# Patient Record
Sex: MALE | Race: WHITE | Employment: UNEMPLOYED | URBAN - METROPOLITAN AREA
[De-identification: names, ages, dates, MRNs, and addresses within clinical notes are randomized per-mention and may not be internally consistent; named-entity substitution may affect disease eponyms.]

---

## 2021-08-07 ENCOUNTER — APPOINTMENT (OUTPATIENT)
Dept: GENERAL RADIOLOGY | Age: 51
DRG: 720 | End: 2021-08-07
Attending: INTERNAL MEDICINE
Payer: COMMERCIAL

## 2021-08-07 ENCOUNTER — HOSPITAL ENCOUNTER (INPATIENT)
Age: 51
LOS: 6 days | Discharge: HOME | DRG: 720 | End: 2021-08-13
Attending: INTERNAL MEDICINE | Admitting: INTERNAL MEDICINE
Payer: COMMERCIAL

## 2021-08-07 ENCOUNTER — APPOINTMENT (OUTPATIENT)
Dept: NON INVASIVE DIAGNOSTICS | Age: 51
DRG: 720 | End: 2021-08-07
Attending: INTERNAL MEDICINE
Payer: COMMERCIAL

## 2021-08-07 ENCOUNTER — APPOINTMENT (OUTPATIENT)
Dept: CT IMAGING | Age: 51
DRG: 720 | End: 2021-08-07
Attending: INTERNAL MEDICINE
Payer: COMMERCIAL

## 2021-08-07 PROBLEM — N17.9 AKI (ACUTE KIDNEY INJURY) (HCC): Status: ACTIVE | Noted: 2021-08-07

## 2021-08-07 LAB
ALBUMIN SERPL-MCNC: 2.3 G/DL (ref 3.5–5)
ALBUMIN SERPL-MCNC: 2.4 G/DL (ref 3.5–5)
ALBUMIN SERPL-MCNC: 2.7 G/DL (ref 3.5–5)
ALBUMIN/GLOB SERPL: 0.7 {RATIO} (ref 1.1–2.2)
ALBUMIN/GLOB SERPL: 0.8 {RATIO} (ref 1.1–2.2)
ALBUMIN/GLOB SERPL: 0.8 {RATIO} (ref 1.1–2.2)
ALP SERPL-CCNC: 103 U/L (ref 45–117)
ALP SERPL-CCNC: 87 U/L (ref 45–117)
ALP SERPL-CCNC: 91 U/L (ref 45–117)
ALT SERPL-CCNC: 12 U/L (ref 12–78)
ANION GAP SERPL CALC-SCNC: 16 MMOL/L (ref 5–15)
ANION GAP SERPL CALC-SCNC: 7 MMOL/L (ref 5–15)
ANION GAP SERPL CALC-SCNC: 8 MMOL/L (ref 5–15)
APPEARANCE UR: CLEAR
AST SERPL-CCNC: 7 U/L (ref 15–37)
AST SERPL-CCNC: 7 U/L (ref 15–37)
AST SERPL-CCNC: 8 U/L (ref 15–37)
AV R PG: 55.19 MMHG
BACTERIA URNS QL MICRO: NEGATIVE /HPF
BASOPHILS # BLD: 0 K/UL (ref 0–0.1)
BASOPHILS NFR BLD: 0 % (ref 0–1)
BILIRUB SERPL-MCNC: 0.4 MG/DL (ref 0.2–1)
BILIRUB SERPL-MCNC: 0.4 MG/DL (ref 0.2–1)
BILIRUB SERPL-MCNC: 0.5 MG/DL (ref 0.2–1)
BILIRUB UR QL: NEGATIVE
BUN SERPL-MCNC: 117 MG/DL (ref 6–20)
BUN SERPL-MCNC: 127 MG/DL (ref 6–20)
BUN SERPL-MCNC: 94 MG/DL (ref 6–20)
BUN/CREAT SERPL: 11 (ref 12–20)
BUN/CREAT SERPL: 13 (ref 12–20)
BUN/CREAT SERPL: 16 (ref 12–20)
CA-I BLD-SCNC: 1.17 MMOL/L (ref 1.12–1.32)
CALCIUM SERPL-MCNC: 8.2 MG/DL (ref 8.5–10.1)
CALCIUM SERPL-MCNC: 8.3 MG/DL (ref 8.5–10.1)
CALCIUM SERPL-MCNC: 8.8 MG/DL (ref 8.5–10.1)
CHLORIDE SERPL-SCNC: 106 MMOL/L (ref 97–108)
CHLORIDE SERPL-SCNC: 108 MMOL/L (ref 97–108)
CHLORIDE SERPL-SCNC: 109 MMOL/L (ref 97–108)
CO2 SERPL-SCNC: 20 MMOL/L (ref 21–32)
CO2 SERPL-SCNC: 26 MMOL/L (ref 21–32)
CO2 SERPL-SCNC: 28 MMOL/L (ref 21–32)
COLOR UR: ABNORMAL
CREAT SERPL-MCNC: 11.9 MG/DL (ref 0.7–1.3)
CREAT SERPL-MCNC: 5.99 MG/DL (ref 0.7–1.3)
CREAT SERPL-MCNC: 8.87 MG/DL (ref 0.7–1.3)
DIFFERENTIAL METHOD BLD: ABNORMAL
ECHO AO ROOT DIAM: 3.45 CM
ECHO AR MAX VEL PISA: 371.44 CM/S
ECHO AV AREA PEAK VELOCITY: 2.2 CM2
ECHO AV AREA/BSA PEAK VELOCITY: 1.2 CM2/M2
ECHO AV PEAK GRADIENT: 10.94 MMHG
ECHO AV PEAK VELOCITY: 165.41 CM/S
ECHO AV REGURGITANT PHT: 275.24 MS
ECHO EST RA PRESSURE: 10 MMHG
ECHO IVC PROX: 2.19 CM
ECHO LA MAJOR AXIS: 3.21 CM
ECHO LA MINOR AXIS: 1.68 CM
ECHO LV E' LATERAL VELOCITY: 9.08 CM/S
ECHO LV E' SEPTAL VELOCITY: 6.3 CM/S
ECHO LV EDV A2C: 96.01 ML
ECHO LV EDV A4C: 61.83 ML
ECHO LV EDV BP: 77.68 ML (ref 67–155)
ECHO LV EDV INDEX A4C: 32.4 ML/M2
ECHO LV EDV INDEX BP: 40.7 ML/M2
ECHO LV EDV NDEX A2C: 50.3 ML/M2
ECHO LV EJECTION FRACTION A2C: 72 PERCENT
ECHO LV EJECTION FRACTION A4C: 60 PERCENT
ECHO LV EJECTION FRACTION BIPLANE: 66.2 PERCENT (ref 55–100)
ECHO LV ESV A2C: 27.02 ML
ECHO LV ESV A4C: 24.75 ML
ECHO LV ESV BP: 26.23 ML (ref 22–58)
ECHO LV ESV INDEX A2C: 14.1 ML/M2
ECHO LV ESV INDEX A4C: 13 ML/M2
ECHO LV ESV INDEX BP: 13.7 ML/M2
ECHO LV INTERNAL DIMENSION DIASTOLIC: 4.44 CM (ref 4.2–5.9)
ECHO LV INTERNAL DIMENSION SYSTOLIC: 2.98 CM
ECHO LV IVSD: 1.05 CM (ref 0.6–1)
ECHO LV MASS 2D: 163.7 G (ref 88–224)
ECHO LV MASS INDEX 2D: 85.7 G/M2 (ref 49–115)
ECHO LV POSTERIOR WALL DIASTOLIC: 1.08 CM (ref 0.6–1)
ECHO LVOT DIAM: 2.04 CM
ECHO LVOT PEAK GRADIENT: 5.01 MMHG
ECHO LVOT PEAK VELOCITY: 111.92 CM/S
ECHO MV A VELOCITY: 81.06 CM/S
ECHO MV AREA PHT: 6.3 CM2
ECHO MV E DECELERATION TIME (DT): 120.49 MS
ECHO MV E VELOCITY: 49.61 CM/S
ECHO MV E/A RATIO: 0.61
ECHO MV E/E' LATERAL: 5.46
ECHO MV E/E' RATIO (AVERAGED): 6.67
ECHO MV E/E' SEPTAL: 7.87
ECHO MV PRESSURE HALF TIME (PHT): 34.94 MS
ECHO PV PEAK INSTANTANEOUS GRADIENT SYSTOLIC: 2.75 MMHG
ECHO RIGHT VENTRICULAR SYSTOLIC PRESSURE (RVSP): 38.66 MMHG
ECHO RV INTERNAL DIMENSION: 3.32 CM
ECHO RV TAPSE: 1.46 CM (ref 1.5–2)
ECHO TV REGURGITANT MAX VELOCITY: 267.66 CM/S
ECHO TV REGURGITANT PEAK GRADIENT: 28.66 MMHG
EOSINOPHIL # BLD: 0 K/UL (ref 0–0.4)
EOSINOPHIL NFR BLD: 0 % (ref 0–7)
EPITH CASTS URNS QL MICRO: ABNORMAL /LPF
ERYTHROCYTE [DISTWIDTH] IN BLOOD BY AUTOMATED COUNT: 12.4 % (ref 11.5–14.5)
GLOBULIN SER CALC-MCNC: 3.1 G/DL (ref 2–4)
GLOBULIN SER CALC-MCNC: 3.3 G/DL (ref 2–4)
GLOBULIN SER CALC-MCNC: 3.3 G/DL (ref 2–4)
GLUCOSE SERPL-MCNC: 126 MG/DL (ref 65–100)
GLUCOSE SERPL-MCNC: 137 MG/DL (ref 65–100)
GLUCOSE SERPL-MCNC: 219 MG/DL (ref 65–100)
GLUCOSE UR STRIP.AUTO-MCNC: NEGATIVE MG/DL
HCT VFR BLD AUTO: 30.8 % (ref 36.6–50.3)
HGB BLD-MCNC: 10.2 G/DL (ref 12.1–17)
HGB UR QL STRIP: ABNORMAL
IMM GRANULOCYTES # BLD AUTO: 0 K/UL
IMM GRANULOCYTES NFR BLD AUTO: 0 %
INR PPP: 1.2 (ref 0.9–1.1)
KETONES UR QL STRIP.AUTO: NEGATIVE MG/DL
LACTATE SERPL-SCNC: 1.1 MMOL/L (ref 0.4–2)
LEUKOCYTE ESTERASE UR QL STRIP.AUTO: NEGATIVE
LYMPHOCYTES # BLD: 0 K/UL (ref 0.8–3.5)
LYMPHOCYTES NFR BLD: 0 % (ref 12–49)
MAGNESIUM SERPL-MCNC: 1.6 MG/DL (ref 1.6–2.4)
MAGNESIUM SERPL-MCNC: 1.7 MG/DL (ref 1.6–2.4)
MAGNESIUM SERPL-MCNC: 2 MG/DL (ref 1.6–2.4)
MCH RBC QN AUTO: 30.6 PG (ref 26–34)
MCHC RBC AUTO-ENTMCNC: 33.1 G/DL (ref 30–36.5)
MCV RBC AUTO: 92.5 FL (ref 80–99)
MONOCYTES # BLD: 0 K/UL (ref 0–1)
MONOCYTES NFR BLD: 0 % (ref 5–13)
NEUTS SEG # BLD: 0 K/UL (ref 1.8–8)
NEUTS SEG NFR BLD: 0 % (ref 32–75)
NITRITE UR QL STRIP.AUTO: NEGATIVE
NRBC # BLD: 0 K/UL (ref 0–0.01)
NRBC BLD-RTO: 0 PER 100 WBC
PH UR STRIP: 5 [PH] (ref 5–8)
PHOSPHATE SERPL-MCNC: 4.7 MG/DL (ref 2.6–4.7)
PHOSPHATE SERPL-MCNC: 5.5 MG/DL (ref 2.6–4.7)
PHOSPHATE SERPL-MCNC: 7 MG/DL (ref 2.6–4.7)
PLATELET # BLD AUTO: 187 K/UL (ref 150–400)
PMV BLD AUTO: 10.8 FL (ref 8.9–12.9)
POTASSIUM SERPL-SCNC: 3.7 MMOL/L (ref 3.5–5.1)
POTASSIUM SERPL-SCNC: 4.2 MMOL/L (ref 3.5–5.1)
POTASSIUM SERPL-SCNC: 4.2 MMOL/L (ref 3.5–5.1)
PROCALCITONIN SERPL-MCNC: 3.18 NG/ML
PROT SERPL-MCNC: 5.5 G/DL (ref 6.4–8.2)
PROT SERPL-MCNC: 5.6 G/DL (ref 6.4–8.2)
PROT SERPL-MCNC: 6 G/DL (ref 6.4–8.2)
PROT UR STRIP-MCNC: 30 MG/DL
PROTHROMBIN TIME: 12.5 SEC (ref 9–11.1)
RBC # BLD AUTO: 3.33 M/UL (ref 4.1–5.7)
RBC #/AREA URNS HPF: ABNORMAL /HPF (ref 0–5)
RBC MORPH BLD: ABNORMAL
SODIUM SERPL-SCNC: 142 MMOL/L (ref 136–145)
SODIUM SERPL-SCNC: 142 MMOL/L (ref 136–145)
SODIUM SERPL-SCNC: 144 MMOL/L (ref 136–145)
SP GR UR REFRACTOMETRY: 1.01 (ref 1–1.03)
TOTAL CELLS COUNTED SPEC: 0
UR CULT HOLD, URHOLD: NORMAL
URATE SERPL-MCNC: 11.2 MG/DL (ref 3.5–7.2)
URATE SERPL-MCNC: 14.5 MG/DL (ref 3.5–7.2)
URATE SERPL-MCNC: 7.1 MG/DL (ref 3.5–7.2)
UROBILINOGEN UR QL STRIP.AUTO: 0.2 EU/DL (ref 0.2–1)
WBC # BLD AUTO: 0.2 K/UL (ref 4.1–11.1)
WBC MORPH BLD: ABNORMAL
WBC URNS QL MICRO: ABNORMAL /HPF (ref 0–4)

## 2021-08-07 PROCEDURE — 74011250636 HC RX REV CODE- 250/636: Performed by: INTERNAL MEDICINE

## 2021-08-07 PROCEDURE — 74011000258 HC RX REV CODE- 258: Performed by: INTERNAL MEDICINE

## 2021-08-07 PROCEDURE — 36415 COLL VENOUS BLD VENIPUNCTURE: CPT

## 2021-08-07 PROCEDURE — 83605 ASSAY OF LACTIC ACID: CPT

## 2021-08-07 PROCEDURE — 65610000006 HC RM INTENSIVE CARE

## 2021-08-07 PROCEDURE — 85025 COMPLETE CBC W/AUTO DIFF WBC: CPT

## 2021-08-07 PROCEDURE — 74011250637 HC RX REV CODE- 250/637: Performed by: INTERNAL MEDICINE

## 2021-08-07 PROCEDURE — 93306 TTE W/DOPPLER COMPLETE: CPT | Performed by: INTERNAL MEDICINE

## 2021-08-07 PROCEDURE — 77010033678 HC OXYGEN DAILY

## 2021-08-07 PROCEDURE — 82330 ASSAY OF CALCIUM: CPT

## 2021-08-07 PROCEDURE — 84145 PROCALCITONIN (PCT): CPT

## 2021-08-07 PROCEDURE — 74011000250 HC RX REV CODE- 250: Performed by: INTERNAL MEDICINE

## 2021-08-07 PROCEDURE — 84550 ASSAY OF BLOOD/URIC ACID: CPT

## 2021-08-07 PROCEDURE — 83735 ASSAY OF MAGNESIUM: CPT

## 2021-08-07 PROCEDURE — 74176 CT ABD & PELVIS W/O CONTRAST: CPT

## 2021-08-07 PROCEDURE — 71045 X-RAY EXAM CHEST 1 VIEW: CPT

## 2021-08-07 PROCEDURE — 80053 COMPREHEN METABOLIC PANEL: CPT

## 2021-08-07 PROCEDURE — 71250 CT THORAX DX C-: CPT

## 2021-08-07 PROCEDURE — 93306 TTE W/DOPPLER COMPLETE: CPT

## 2021-08-07 PROCEDURE — 74011000250 HC RX REV CODE- 250: Performed by: NURSE PRACTITIONER

## 2021-08-07 PROCEDURE — 84100 ASSAY OF PHOSPHORUS: CPT

## 2021-08-07 PROCEDURE — 87040 BLOOD CULTURE FOR BACTERIA: CPT

## 2021-08-07 PROCEDURE — 81001 URINALYSIS AUTO W/SCOPE: CPT

## 2021-08-07 PROCEDURE — 85610 PROTHROMBIN TIME: CPT

## 2021-08-07 RX ORDER — ACETAMINOPHEN 650 MG/1
650 SUPPOSITORY RECTAL
Status: DISCONTINUED | OUTPATIENT
Start: 2021-08-07 | End: 2021-08-13 | Stop reason: HOSPADM

## 2021-08-07 RX ORDER — NOREPINEPHRINE BITARTRATE/D5W 8 MG/250ML
0-16 PLASTIC BAG, INJECTION (ML) INTRAVENOUS
Status: DISCONTINUED | OUTPATIENT
Start: 2021-08-07 | End: 2021-08-09

## 2021-08-07 RX ORDER — SODIUM BICARBONATE IN D5W 150/1000ML
PLASTIC BAG, INJECTION (ML) INTRAVENOUS CONTINUOUS
Status: DISCONTINUED | OUTPATIENT
Start: 2021-08-07 | End: 2021-08-07

## 2021-08-07 RX ORDER — SODIUM CHLORIDE, SODIUM LACTATE, POTASSIUM CHLORIDE, CALCIUM CHLORIDE 600; 310; 30; 20 MG/100ML; MG/100ML; MG/100ML; MG/100ML
50 INJECTION, SOLUTION INTRAVENOUS CONTINUOUS
Status: DISCONTINUED | OUTPATIENT
Start: 2021-08-07 | End: 2021-08-13 | Stop reason: HOSPADM

## 2021-08-07 RX ORDER — ACETAMINOPHEN 325 MG/1
650 TABLET ORAL
Status: DISCONTINUED | OUTPATIENT
Start: 2021-08-07 | End: 2021-08-13 | Stop reason: HOSPADM

## 2021-08-07 RX ORDER — SODIUM CHLORIDE 0.9 % (FLUSH) 0.9 %
5-40 SYRINGE (ML) INJECTION AS NEEDED
Status: DISCONTINUED | OUTPATIENT
Start: 2021-08-07 | End: 2021-08-13 | Stop reason: HOSPADM

## 2021-08-07 RX ORDER — POLYETHYLENE GLYCOL 3350 17 G/17G
17 POWDER, FOR SOLUTION ORAL DAILY PRN
Status: DISCONTINUED | OUTPATIENT
Start: 2021-08-07 | End: 2021-08-13 | Stop reason: HOSPADM

## 2021-08-07 RX ORDER — VANCOMYCIN/0.9 % SOD CHLORIDE 1.5G/250ML
1500 PLASTIC BAG, INJECTION (ML) INTRAVENOUS ONCE
Status: COMPLETED | OUTPATIENT
Start: 2021-08-07 | End: 2021-08-07

## 2021-08-07 RX ORDER — ONDANSETRON 4 MG/1
4 TABLET, ORALLY DISINTEGRATING ORAL
Status: DISCONTINUED | OUTPATIENT
Start: 2021-08-07 | End: 2021-08-11

## 2021-08-07 RX ORDER — ONDANSETRON 2 MG/ML
4 INJECTION INTRAMUSCULAR; INTRAVENOUS
Status: DISCONTINUED | OUTPATIENT
Start: 2021-08-07 | End: 2021-08-11

## 2021-08-07 RX ORDER — SODIUM CHLORIDE 0.9 % (FLUSH) 0.9 %
5-40 SYRINGE (ML) INJECTION EVERY 8 HOURS
Status: DISCONTINUED | OUTPATIENT
Start: 2021-08-07 | End: 2021-08-13 | Stop reason: HOSPADM

## 2021-08-07 RX ORDER — HYDROMORPHONE HCL/0.9% NACL/PF 0.5 MG/ML
PLASTIC BAG, INJECTION (ML) INTRAVENOUS CONTINUOUS
Status: DISCONTINUED | OUTPATIENT
Start: 2021-08-07 | End: 2021-08-11

## 2021-08-07 RX ADMIN — NOREPINEPHRINE BITARTRATE 11 MCG/MIN: 1 INJECTION, SOLUTION, CONCENTRATE INTRAVENOUS at 15:04

## 2021-08-07 RX ADMIN — SODIUM CHLORIDE, POTASSIUM CHLORIDE, SODIUM LACTATE AND CALCIUM CHLORIDE 100 ML/HR: 600; 310; 30; 20 INJECTION, SOLUTION INTRAVENOUS at 14:08

## 2021-08-07 RX ADMIN — CEFEPIME 2 G: 2 INJECTION, POWDER, FOR SOLUTION INTRAVENOUS at 04:18

## 2021-08-07 RX ADMIN — TBO-FILGRASTIM 480 MCG: 480 INJECTION, SOLUTION SUBCUTANEOUS at 17:11

## 2021-08-07 RX ADMIN — SODIUM CHLORIDE 3 MG: 9 INJECTION, SOLUTION INTRAVENOUS at 05:05

## 2021-08-07 RX ADMIN — Medication: at 21:04

## 2021-08-07 RX ADMIN — NOREPINEPHRINE BITARTRATE 4 MCG/MIN: 1 INJECTION, SOLUTION, CONCENTRATE INTRAVENOUS at 04:33

## 2021-08-07 RX ADMIN — ACETAMINOPHEN 650 MG: 650 SUPPOSITORY RECTAL at 04:46

## 2021-08-07 RX ADMIN — Medication 10 ML: at 14:58

## 2021-08-07 RX ADMIN — Medication 10 ML: at 05:12

## 2021-08-07 RX ADMIN — VANCOMYCIN HYDROCHLORIDE 1500 MG: 10 INJECTION, POWDER, LYOPHILIZED, FOR SOLUTION INTRAVENOUS at 04:56

## 2021-08-07 RX ADMIN — Medication 10 ML: at 21:10

## 2021-08-07 RX ADMIN — Medication: at 04:43

## 2021-08-07 NOTE — CONSULTS
Hematology Oncology Consultation    Reason for consult:     Admitting Diagnosis: TOM (acute kidney injury) ([facility] [address] [N17.9]    HPI:   48 yr old w m , with advanced gastric cancer,treated by Desi Whitman oncologist in [place], transferred from [facility] ED where he presented with 1 week of  Intractable n/v/d and lethargy, brought in by [facility] from home , s/p folfiri on 7/27/21, off pump on 7/29, with notes indicating that he had emesis 13-15 times/day after initiating chemo on 7/27- had prior episodes , per sister, but not as bad . He was sent home from local infusion center with oral antiemetics and instructions to drink plenty of fluids. Per family member, this continued over next several days and he became increasing lethargic, confused and weak, promting a call to 911. In local ED, he was found to be in TOM - Creat of 17, high K of 6.6 and then transferred to [address] [place] [address] for possible CRRT. Seen by nephrology service here overnight, felt to be in severe TOM - from severe dehydration +/- TLS ( high uric acid) , today was given a dose of rasburicase. Also noted to be profoundly neutropenic, with total wbc 0.2 , hb 8 and normal plts. Impression:   *) Gastric Adenocarcinoma- metastatic, HER2 and PD-L1 negative, MMR intact.    --Started on [facility] [place] 2[place] for initially neoadjuvant chemo (could not tolerate docetaxel)- initial intent was curative, but  Disease progression in June 2021, pt delayed initiation of chemotherapy  --c1d1 folfiri - No pegfilgrastim -7/27/21   --Hold further treatment till clinically stable  and per primary oncologist     *)Neutropenic Sepsis  --I see no bd or urine cultures taken on admission  --s/p 1 dose vanco and on q24h Cefepime - for whatever is worth now, given the fevers overnight, will do bd cultures and urine culture , cxr- unrevealing  --Cont Cefepime - follow cultures, consider non iv contrast CT chest/abdo and pelvis when stable also   --Neutropenic precautions, start filgrastim 480 mcg sc daily- ordered( no pegfilgrastim used)   --Watch daily cbc    *)Intractable n/v and d - after folfiri, although similar but milder symptoms started prior to chemotherapy, per sister. ---Likely chemo related, but need to r/o gastric outlet obstruction/ DOMINIC ( abdo soft but tender )   ---No vomiting overnight, but has not been fed, somewhat lethargic    *)Severe TOM  ---Per nephrology, ATN( from profound , prolonged n/v and diarrhea)  +/- TLS ( would be very unlikely in gastric adenoca)  ---creat, K and U/O is getting better, No CRRT required  ---was given 1 dose rasburicase -8/7/21-per nephrology    *)Pain management  --Was  Followed by palliative care medicine there- on oxycodone 5 mg po q4hr and on MS contin 15 mg po bid, which per his sister, he was trying to take. Control pain aggressively    D/w RN, pt and sister        Discussion: Liliana Bowles is a  48y.o. year old seen in consultation at the request of Dr. orozco . Recommendations:     Imaging:    reviewed    Labs:    Recent Results (from the past 24 hour(s))   METABOLIC PANEL, COMPREHENSIVE    Collection Time: 08/07/21  3:31 AM   Result Value Ref Range    Sodium 142 136 - 145 mmol/L    Potassium 4.2 3.5 - 5.1 mmol/L    Chloride 106 97 - 108 mmol/L    CO2 20 (L) 21 - 32 mmol/L    Anion gap 16 (H) 5 - 15 mmol/L    Glucose 126 (H) 65 - 100 mg/dL     (H) 6 - 20 MG/DL    Creatinine 11.90 (H) 0.70 - 1.30 MG/DL    BUN/Creatinine ratio 11 (L) 12 - 20      GFR est AA 6 (L) >60 ml/min/1.73m2    GFR est non-AA 5 (L) >60 ml/min/1.73m2    Calcium 8.8 8.5 - 10.1 MG/DL    Bilirubin, total 0.5 0.2 - 1.0 MG/DL    ALT (SGPT) 12 12 - 78 U/L    AST (SGOT) 7 (L) 15 - 37 U/L    Alk.  phosphatase 103 45 - 117 U/L    Protein, total 6.0 (L) 6.4 - 8.2 g/dL    Albumin 2.7 (L) 3.5 - 5.0 g/dL    Globulin 3.3 2.0 - 4.0 g/dL    A-G Ratio 0.8 (L) 1.1 - 2.2     LACTIC ACID    Collection Time: 08/07/21  3:31 AM   Result Value Ref Range    Lactic acid 1.1 0.4 - 2.0 MMOL/L   MAGNESIUM Collection Time: 08/07/21  3:31 AM   Result Value Ref Range    Magnesium 2.0 1.6 - 2.4 mg/dL   PHOSPHORUS    Collection Time: 08/07/21  3:31 AM   Result Value Ref Range    Phosphorus 7.0 (H) 2.6 - 4.7 MG/DL   URIC ACID    Collection Time: 08/07/21  3:31 AM   Result Value Ref Range    Uric acid 14.5 (H) 3.5 - 7.2 MG/DL   CBC WITH AUTOMATED DIFF    Collection Time: 08/07/21  4:26 AM   Result Value Ref Range    WBC 0.2 (LL) 4.1 - 11.1 K/uL    RBC 3.33 (L) 4.10 - 5.70 M/uL    HGB 10.2 (L) 12.1 - 17.0 g/dL    HCT 30.8 (L) 36.6 - 50.3 %    MCV 92.5 80.0 - 99.0 FL    MCH 30.6 26.0 - 34.0 PG    MCHC 33.1 30.0 - 36.5 g/dL    RDW 12.4 11.5 - 14.5 %    PLATELET 215 163 - 267 K/uL    MPV 10.8 8.9 - 12.9 FL    NRBC 0.0 0  WBC    ABSOLUTE NRBC 0.00 0.00 - 0.01 K/uL    NEUTROPHILS 0 (L) 32 - 75 %    LYMPHOCYTES 0 (L) 12 - 49 %    MONOCYTES 0 (L) 5 - 13 %    EOSINOPHILS 0 0 - 7 %    BASOPHILS 0 0 - 1 %    IMMATURE GRANULOCYTES 0 %    TOTAL CELLS COUNTED 0      ABS. NEUTROPHILS 0.0 (L) 1.8 - 8.0 K/UL    ABS. LYMPHOCYTES 0.0 (L) 0.8 - 3.5 K/UL    ABS. MONOCYTES 0.0 0.0 - 1.0 K/UL    ABS. EOSINOPHILS 0.0 0.0 - 0.4 K/UL    ABS. BASOPHILS 0.0 0.0 - 0.1 K/UL    ABS. IMM.  GRANS. 0.0 K/UL    DF SMEAR SCANNED      RBC COMMENTS NORMOCYTIC, NORMOCHROMIC      WBC COMMENTS        WBC too low for accurate differential. Scan of smear shows   PROTHROMBIN TIME + INR    Collection Time: 08/07/21  4:26 AM   Result Value Ref Range    INR 1.2 (H) 0.9 - 1.1      Prothrombin time 12.5 (H) 9.0 - 11.1 sec   IONIZED CALCIUM    Collection Time: 08/07/21  4:38 AM   Result Value Ref Range    Calcium, ionized 1.17 1.12 - 1.32 mmol/L   PROCALCITONIN    Collection Time: 08/07/21  7:16 AM   Result Value Ref Range    Procalcitonin 3.18 ng/mL   ECHO ADULT COMPLETE    Collection Time: 08/07/21  9:13 AM   Result Value Ref Range    IVSd 1.05 (A) 0.60 - 1.00 cm    LVIDd 4.44 4.20 - 5.90 cm    LVIDs 2.98 cm    LVOT d 2.04 cm    LVPWd 1.08 (A) 0.60 - 1.00 cm BP EF 66.2 55.0 - 100.0 percent    LV Ejection Fraction MOD 2C 72 percent    LV Ejection Fraction MOD 4C 60 percent    LV ED Vol A2C 96.01 mL    LV ED Vol A4C 61.83 mL    LV ED Vol BP 77.68 67.0 - 155.0 mL    LV ES Vol A2C 27.02 mL    LV ES Vol A4C 24.75 mL    LV ES Vol BP 26.23 22.0 - 58.0 mL    LVOT Peak Gradient 5.01 mmHg    LVOT Peak Velocity 111.92 cm/s    RVIDd 3.32 cm    RVSP 38.66 mmHg    Left Atrium Major Axis 3.21 cm    Est. RA Pressure 10.00 mmHg    Aortic Valve Area by Continuity of Peak Velocity 2.20 cm2    AV R PG 55.19 mmHg    Aortic Regurgitant Pressure Half-time 275.24 ms    AR Max Matty 371.44 cm/s    AoV PG 10.94 mmHg    Aortic Valve Systolic Peak Velocity 009.75 cm/s    MV A Matty 81.06 cm/s    Mitral Valve E Wave Deceleration Time 120.49 ms    MV E Matty 49.61 cm/s    E/E' lateral 5.46     E/E' septal 7.88     LV E' Lateral Velocity 9.08 cm/s    LV E' Septal Velocity 6.30 cm/s    Mitral Valve Pressure Half-time 34.94 ms    MVA (PHT) 6.30 cm2    Pulmonic Valve Systolic Peak Instantaneous Gradient 2.75 mmHg    Tapse 1.46 (A) 1.50 - 2.00 cm    Triscuspid Valve Regurgitation Peak Gradient 28.66 mmHg    TR Max Velocity 267.66 cm/s    Ao Root D 3.45 cm    IVC proximal 6.85 cm   METABOLIC PANEL, COMPREHENSIVE    Collection Time: 08/07/21 10:12 AM   Result Value Ref Range    Sodium 142 136 - 145 mmol/L    Potassium 4.2 3.5 - 5.1 mmol/L    Chloride 108 97 - 108 mmol/L    CO2 26 21 - 32 mmol/L    Anion gap 8 5 - 15 mmol/L    Glucose 219 (H) 65 - 100 mg/dL     (H) 6 - 20 MG/DL    Creatinine 8.87 (H) 0.70 - 1.30 MG/DL    BUN/Creatinine ratio 13 12 - 20      GFR est AA 8 (L) >60 ml/min/1.73m2    GFR est non-AA 6 (L) >60 ml/min/1.73m2    Calcium 8.3 (L) 8.5 - 10.1 MG/DL    Bilirubin, total 0.4 0.2 - 1.0 MG/DL    ALT (SGPT) 12 12 - 78 U/L    AST (SGOT) 7 (L) 15 - 37 U/L    Alk.  phosphatase 91 45 - 117 U/L    Protein, total 5.5 (L) 6.4 - 8.2 g/dL    Albumin 2.4 (L) 3.5 - 5.0 g/dL    Globulin 3.1 2.0 - 4.0 g/dL A-G Ratio 0.8 (L) 1.1 - 2.2     MAGNESIUM    Collection Time: 08/07/21 10:12 AM   Result Value Ref Range    Magnesium 1.7 1.6 - 2.4 mg/dL   PHOSPHORUS    Collection Time: 08/07/21 10:12 AM   Result Value Ref Range    Phosphorus 5.5 (H) 2.6 - 4.7 MG/DL   URIC ACID    Collection Time: 08/07/21 10:12 AM   Result Value Ref Range    Uric acid 11.2 (H) 3.5 - 7.2 MG/DL       History:  Past Medical History:   Diagnosis Date    Other ill-defined conditions(799.89) 8-8-13    Hgb 15.1, K 4.1, creat 0.6, EKG: NSR, ? inf  MI    Other ill-defined conditions(799.89)     obesity    Other ill-defined conditions(799.89) 11-22-13    Hgb 14.9, K 3.8, creat 0.8, EKG: NSR      Past Surgical History:   Procedure Laterality Date    HX KNEE ARTHROSCOPY Left at age 21    ACL repair    HX KNEE REPLACEMENT      HX ORTHOPAEDIC Right     hand    HX ORTHOPAEDIC Left 2013    TKR      Prior to Admission medications    Not on File     No Known Allergies   Social History     Tobacco Use    Smoking status: Current Every Day Smoker     Packs/day: 0.50     Years: 20.00     Pack years: 10.00     Types: Cigarettes   Substance Use Topics    Alcohol use: No      No family history on file.      Current Medications:  Current Facility-Administered Medications   Medication Dose Route Frequency    sodium chloride (NS) flush 5-40 mL  5-40 mL IntraVENous Q8H    sodium chloride (NS) flush 5-40 mL  5-40 mL IntraVENous PRN    acetaminophen (TYLENOL) tablet 650 mg  650 mg Oral Q6H PRN    Or    acetaminophen (TYLENOL) suppository 650 mg  650 mg Rectal Q6H PRN    polyethylene glycol (MIRALAX) packet 17 g  17 g Oral DAILY PRN    ondansetron (ZOFRAN ODT) tablet 4 mg  4 mg Oral Q8H PRN    Or    ondansetron (ZOFRAN) injection 4 mg  4 mg IntraVENous Q6H PRN    NOREPINephrine (LEVOPHED) 8 mg in 5% dextrose 250mL (32 mcg/mL) infusion  0-16 mcg/min IntraVENous TITRATE    vasopressin (VASOSTRICT) 20 Units in 0.9% sodium chloride 100 mL infusion  0.03 Units/min IntraVENous CONTINUOUS    cefepime (MAXIPIME) 2 g in 0.9% sodium chloride (MBP/ADV) 100 mL MBP  2 g IntraVENous Q24H    Vancomycin - Pharmacy to Dose   Other Rx Dosing/Monitoring    lactated Ringers infusion  100 mL/hr IntraVENous CONTINUOUS    [START ON 8/8/2021] Vancomycin random level - due 8/8 with am labs. Thanks!    Other ONCE         Review of Systems:  12 point ROS attempted, pt too lethargic- history from issiter and chart, RN    Physical Exam:  MS-Lethargic, follows commands  General -chronically ill appearing   Neck-Supple, No JVD  CVS-S1,S2 tachy+  Chest wall- Port site -looks fine,   RS-CTA b/l- dec at bases  Abdomen- Soft, tenderness in epigastrium   Extre- No c/c/-1+ edema   Neuro- No FND but gen weakness+

## 2021-08-07 NOTE — CONSULTS
NEPHROLOGY CONSULT NOTE     Patient: Raymon Lester MRN: [id number]  PCP: None   :     1970  Age:   48 y.o. Sex:  male      Referring physician: Mary Carmen French MD  Reason for consultation: 48 y.o. male with TOM (acute kidney injury) ([facility] [H60.6] complicated by TOM   Admission Date: 2021  2:55 AM  LOS: 0 days      ASSESSMENT and PLAN :   TOM:  - suspect 2/2 ATN from profound volume depletion vs TLS  - CT scan from OSH neg for obstruction  - will start CRRT  - pt consented  - Saira Reece aware  - f/u uric acid, phos, K     Hyperk:  - CRRT as above    ? TLS:  - repeat labs pending  - CRRT as above  - oncology consulted    Hypotension:  - on levophed  - keep MAP > 65    CHF:  - EF 25%   - ECHO pending    Gastric cancer    N/V/D     Active Problems / Assessment AAActive  : Active Problems:    TOM (acute kidney injury) ([facility] (2021)         Subjective:   HPI: Raymon Lester is a 48 y.o.  male who has been admitted to the hospital for renal failure and hyperkalemia. He has a hx of gastric ca on chemo, CHF, HLD, GERD, HTN. Presented to [address] [place] [address],[address] center for n/v/d x 2 weeks. Found to have severe renal failure, Cr 19, k 6.6, uric acid 19.5. He had 5 L of fluid and was started on levophed. Remains oliguric. Repeat labs show K 5.2, Cr 19. L IJ dialysis line placed, transferred to [facility] ICU for further care. Pt lethargic, poor historian. Labs here pending. Pt has consented for RRT, no other family around. Past Medical Hx:   Past Medical History:   Diagnosis Date    Other ill-defined conditions(799.89) 8-    Hgb 15.1, K 4.1, creat 0.6, EKG: NSR, ?  inf  MI    Other ill-defined conditions(799.89)     obesity    Other ill-defined conditions(799.89) 13    Hgb 14.9, K 3.8, creat 0.8, EKG: NSR        Past Surgical Hx:     Past Surgical History:   Procedure Laterality Date    HX KNEE ARTHROSCOPY Left at age 21    ACL repair    HX KNEE REPLACEMENT      HX ORTHOPAEDIC Right     hand    HX ORTHOPAEDIC Left 2013    TKR       Medications:  Prior to Admission medications    Not on File       No Known Allergies    Social Hx:  reports that he has been smoking cigarettes. He has a 10.00 pack-year smoking history. He does not have any smokeless tobacco history on file. He reports that he does not drink alcohol and does not use drugs. No family history on file. Review of Systems:  A twelve point review of system was performed today. Pertinent positives and negatives are mentioned in the HPI. The reminder of the ROS is negative and noncontributory. Objective:    Vitals:    Vitals:    08/07/21 0258   BP: 115/85   Pulse: (!) 137   Resp: 19   Temp: (!) 102.2 °F (39 °C)   SpO2: 99%   Weight: 78 kg (171 lb 15.3 oz)   Height: 5' 8\" (1.727 m)     I&O's:  No intake/output data recorded. Visit Vitals  /85 (BP 1 Location: Right upper arm, BP Patient Position: Lying)   Pulse (!) 137   Temp (!) 102.2 °F (39 °C)   Resp 19   Ht 5' 8\" (1.727 m)   Wt 78 kg (171 lb 15.3 oz)   SpO2 99%   BMI 26.15 kg/m²       Physical Exam:  General:lethargic  HEENT: Eyes are PERRL. EOMI  Neck: Aure Oleg in place  Lungs : Clears to auscultation Bilaterally, Normal respiratory effort  CVS: RRR, S1 S2 normal, No rub, 3+ LE edema  Abdomen: Soft, Non tender, No hepatosplenomegaly, bowel sounds present  Extremities: No cyanosis, No clubbing  Skin: No rash or lesions.   Lymph nodes: No palpable nodes  MS: No joint swelling, erythema, warmth  Neurologic: lethargic, nonfocal otherwise  : dawson in place    Laboratory Results:    Lab Results   Component Value Date    BUN 9 08/18/2013     11/22/2013    K 3.9 11/22/2013     11/22/2013    CO2 26 11/22/2013       Lab Results   Component Value Date    BUN 9 08/18/2013    BUN 6 (L) 08/16/2013    BUN 6 (L) 08/15/2013    BUN 11 08/14/2013    BUN 13 08/08/2013    K 3.9 11/22/2013    K 3.4 (L) 08/18/2013    K 3.7 08/16/2013    K 3.9 08/15/2013    K 3.9 08/14/2013       Lab Results Component Value Date    WBC 11.1 (H) 08/18/2013    RBC 3.90 (L) 08/18/2013    HGB 14.9 11/22/2013    HCT 35.3 (L) 08/18/2013    MCV 90.5 08/18/2013    MCH 30.0 08/18/2013    RDW 13.2 08/18/2013     (H) 08/18/2013       No results found for: PTH, PHOS    Urine dipstick:   No results found for: COLOR, APPRN, SPGRU, REFSG, LISSA, PROTU, GLUCU, KETU, BILU, UROU, SHABANA, LEUKU, GLUKE, EPSU, BACTU, WBCU, RBCU, CASTS, UCRY    I have reviewed the following: All pertinent labs, microbiology data, radiology imaging for my assessment     ECG- Rev: Yes / No  Xray/CT/US/MRI REV: Yes/ No        Thank you for allowing us to participate in the care of this patient. We will follow patient. Please dont hesitate to call with any questions    Jaylyn Ramirez MD  8/7/2021    [address] [place] [address] [address]ph[address]  Phone - [phone]   Fax - [phone]  [email or web address]

## 2021-08-07 NOTE — PROGRESS NOTES
0730: Bedside and Verbal shift change report given to [address] [place] [address] (oncoming nurse) by Janes Hector RN (offgoing nurse). Report included the following information SBAR, Kardex, Intake/Output, MAR, Recent Results, Cardiac Rhythm ST and Alarm Parameters . 1120: Attempted to call Oncology consult to [facility]. Primary office was closed, prompts followed and I left a message to receive a callback in order to place the consult. 1130: [facility] returned my call. Consult placed. Dr. Leah Hung will see the patient.

## 2021-08-07 NOTE — PROGRESS NOTES
Day #1 of cefepime - consult to dose with CRRT   Indication:  sepsis  Current regimen:  2gm q8h  Abx regimen: vancomcyin    No results for input(s): WBC, CREA, BUN in the last 72 hours. CrCl result from 21 = 21ml/min  Est CrCl: 21 ml/min  Temp (24hrs), Av.2 °F (39 °C), Min:102.2 °F (39 °C), Max:102.2 °F (39 °C)    Cultures:    Order for crrt not found. Nephrology consult placed. Plan: Change to 2 gram q24 at this time.    Dose can be changed to q12h when CRRT begins

## 2021-08-07 NOTE — PROGRESS NOTES
Patient is non oliguric and excellent UOP. Scr and UA is low. Change IVF to [email or web address] ml/hr.

## 2021-08-07 NOTE — PROGRESS NOTES
1545: Patient transported to [address] with monitor x2 RNs.     3803: Returned to room. Uneventful. 1930: Bedside and Verbal shift change report given to SUSHMA Hadley RN (oncoming nurse) by [employer]. Kilo Krishnan RN (offgoing nurse). Report included the following information SBAR, Kardex, ED Summary, Intake/Output, MAR, Recent Results, Med Rec Status, Cardiac Rhythm NSR and Alarm Parameters .

## 2021-08-07 NOTE — PROGRESS NOTES
Repeat labs show improving Cr, K and acidosis  UA 14.5  UOP improving  Hold CRRT for now  Cont bicarb drip  Will give rasburicase x 1 now  Follow serial labs

## 2021-08-07 NOTE — PROGRESS NOTES
80: TRANSFER - IN REPORT:    Verbal report received from [facility], RN (name) on Dion Jhaveri  being received from [address] [place] [address] (unit) for urgent transfer      Report consisted of patients Situation, Background, Assessment and   Recommendations(SBAR). Information from the following report(s) SBAR, Kardex, ED Summary, Intake/Output, MAR, Recent Results, Cardiac Rhythm Sinus tach and Alarm Parameters  was reviewed with the receiving nurse. Opportunity for questions and clarification was provided. Assessment completed upon patients arrival to unit and care assumed. Primary Nurse Faustina Arechiga RN and Aleida Aponte RN performed a dual skin assessment on this patient No impairment noted. Scar left knee, many scabs on all extremities and many tattoos. Small red blacheable area on heels. Jareth score is 14. 0430: Dr. Landen Frank to bedside to examine pt and discuss plan of care. 0496: Per Dr. Landen Frank, hold off on CRRT for now. Will reassess in AM.     0551: Critical lab result for WBC of 0.2. Syeda Phelan NP notified. No new orders received. 0730: Bedside shift change report given to Parisa Carrillo RN (oncoming nurse) by Marilu Dubose RN and Aleida Aponte RN (offgoing nurse). Report included the following information SBAR, Kardex, Intake/Output, MAR, Recent Results, Cardiac Rhythm Sinus tach and Alarm Parameters .

## 2021-08-08 LAB
ALBUMIN SERPL-MCNC: 2.1 G/DL (ref 3.5–5)
ALBUMIN SERPL-MCNC: 2.3 G/DL (ref 3.5–5)
ALBUMIN SERPL-MCNC: 2.4 G/DL (ref 3.5–5)
ALBUMIN/GLOB SERPL: 0.6 {RATIO} (ref 1.1–2.2)
ALBUMIN/GLOB SERPL: 0.7 {RATIO} (ref 1.1–2.2)
ALBUMIN/GLOB SERPL: 0.7 {RATIO} (ref 1.1–2.2)
ALP SERPL-CCNC: 80 U/L (ref 45–117)
ALP SERPL-CCNC: 84 U/L (ref 45–117)
ALP SERPL-CCNC: 88 U/L (ref 45–117)
ALT SERPL-CCNC: 14 U/L (ref 12–78)
ALT SERPL-CCNC: 15 U/L (ref 12–78)
ALT SERPL-CCNC: 16 U/L (ref 12–78)
ANION GAP SERPL CALC-SCNC: 6 MMOL/L (ref 5–15)
ANION GAP SERPL CALC-SCNC: 8 MMOL/L (ref 5–15)
ANION GAP SERPL CALC-SCNC: 8 MMOL/L (ref 5–15)
AST SERPL-CCNC: 10 U/L (ref 15–37)
AST SERPL-CCNC: 8 U/L (ref 15–37)
AST SERPL-CCNC: 9 U/L (ref 15–37)
BACTERIA SPEC CULT: NORMAL
BACTERIA SPEC CULT: NORMAL
BASOPHILS # BLD: 0 K/UL (ref 0–0.1)
BASOPHILS NFR BLD: 0 % (ref 0–1)
BILIRUB SERPL-MCNC: 0.4 MG/DL (ref 0.2–1)
BUN SERPL-MCNC: 61 MG/DL (ref 6–20)
BUN SERPL-MCNC: 72 MG/DL (ref 6–20)
BUN SERPL-MCNC: 84 MG/DL (ref 6–20)
BUN/CREAT SERPL: 18 (ref 12–20)
BUN/CREAT SERPL: 20 (ref 12–20)
BUN/CREAT SERPL: 24 (ref 12–20)
CALCIUM SERPL-MCNC: 8.1 MG/DL (ref 8.5–10.1)
CALCIUM SERPL-MCNC: 8.6 MG/DL (ref 8.5–10.1)
CALCIUM SERPL-MCNC: 8.7 MG/DL (ref 8.5–10.1)
CHLORIDE SERPL-SCNC: 108 MMOL/L (ref 97–108)
CHLORIDE SERPL-SCNC: 108 MMOL/L (ref 97–108)
CHLORIDE SERPL-SCNC: 109 MMOL/L (ref 97–108)
CO2 SERPL-SCNC: 28 MMOL/L (ref 21–32)
CO2 SERPL-SCNC: 29 MMOL/L (ref 21–32)
CO2 SERPL-SCNC: 29 MMOL/L (ref 21–32)
CREAT SERPL-MCNC: 2.53 MG/DL (ref 0.7–1.3)
CREAT SERPL-MCNC: 3.67 MG/DL (ref 0.7–1.3)
CREAT SERPL-MCNC: 4.66 MG/DL (ref 0.7–1.3)
DIFFERENTIAL METHOD BLD: ABNORMAL
EOSINOPHIL # BLD: 0 K/UL (ref 0–0.4)
EOSINOPHIL NFR BLD: 7 % (ref 0–7)
ERYTHROCYTE [DISTWIDTH] IN BLOOD BY AUTOMATED COUNT: 12.1 % (ref 11.5–14.5)
GLOBULIN SER CALC-MCNC: 3.3 G/DL (ref 2–4)
GLOBULIN SER CALC-MCNC: 3.4 G/DL (ref 2–4)
GLOBULIN SER CALC-MCNC: 3.5 G/DL (ref 2–4)
GLUCOSE SERPL-MCNC: 104 MG/DL (ref 65–100)
GLUCOSE SERPL-MCNC: 108 MG/DL (ref 65–100)
GLUCOSE SERPL-MCNC: 120 MG/DL (ref 65–100)
HCT VFR BLD AUTO: 29.6 % (ref 36.6–50.3)
HGB BLD-MCNC: 9.7 G/DL (ref 12.1–17)
IMM GRANULOCYTES # BLD AUTO: 0 K/UL
IMM GRANULOCYTES NFR BLD AUTO: 0 %
INR PPP: 1.5 (ref 0.9–1.1)
LACTATE SERPL-SCNC: 1.9 MMOL/L (ref 0.4–2)
LYMPHOCYTES # BLD: 0.4 K/UL (ref 0.8–3.5)
LYMPHOCYTES NFR BLD: 60 % (ref 12–49)
MAGNESIUM SERPL-MCNC: 1.5 MG/DL (ref 1.6–2.4)
MAGNESIUM SERPL-MCNC: 1.6 MG/DL (ref 1.6–2.4)
MAGNESIUM SERPL-MCNC: 1.6 MG/DL (ref 1.6–2.4)
MCH RBC QN AUTO: 30.4 PG (ref 26–34)
MCHC RBC AUTO-ENTMCNC: 32.8 G/DL (ref 30–36.5)
MCV RBC AUTO: 92.8 FL (ref 80–99)
MONOCYTES # BLD: 0.2 K/UL (ref 0–1)
MONOCYTES NFR BLD: 22 % (ref 5–13)
NEUTS SEG # BLD: 0.1 K/UL (ref 1.8–8)
NEUTS SEG NFR BLD: 11 % (ref 32–75)
NRBC # BLD: 0 K/UL (ref 0–0.01)
NRBC BLD-RTO: 0 PER 100 WBC
PHOSPHATE SERPL-MCNC: 3.2 MG/DL (ref 2.6–4.7)
PHOSPHATE SERPL-MCNC: 3.9 MG/DL (ref 2.6–4.7)
PHOSPHATE SERPL-MCNC: 4.2 MG/DL (ref 2.6–4.7)
PLATELET # BLD AUTO: 164 K/UL (ref 150–400)
PMV BLD AUTO: 10.3 FL (ref 8.9–12.9)
POTASSIUM SERPL-SCNC: 3.4 MMOL/L (ref 3.5–5.1)
POTASSIUM SERPL-SCNC: 3.8 MMOL/L (ref 3.5–5.1)
POTASSIUM SERPL-SCNC: 3.9 MMOL/L (ref 3.5–5.1)
PROT SERPL-MCNC: 5.4 G/DL (ref 6.4–8.2)
PROT SERPL-MCNC: 5.8 G/DL (ref 6.4–8.2)
PROT SERPL-MCNC: 5.8 G/DL (ref 6.4–8.2)
PROTHROMBIN TIME: 15.4 SEC (ref 9–11.1)
RBC # BLD AUTO: 3.19 M/UL (ref 4.1–5.7)
RBC MORPH BLD: ABNORMAL
SERVICE CMNT-IMP: NORMAL
SODIUM SERPL-SCNC: 144 MMOL/L (ref 136–145)
SODIUM SERPL-SCNC: 144 MMOL/L (ref 136–145)
SODIUM SERPL-SCNC: 145 MMOL/L (ref 136–145)
URATE SERPL-MCNC: 3.4 MG/DL (ref 3.5–7.2)
URATE SERPL-MCNC: 4.4 MG/DL (ref 3.5–7.2)
URATE SERPL-MCNC: 5.4 MG/DL (ref 3.5–7.2)
VANCOMYCIN SERPL-MCNC: 13.3 UG/ML
WBC # BLD AUTO: 0.7 K/UL (ref 4.1–11.1)
WBC MORPH BLD: ABNORMAL

## 2021-08-08 PROCEDURE — 74011250636 HC RX REV CODE- 250/636: Performed by: INTERNAL MEDICINE

## 2021-08-08 PROCEDURE — 85025 COMPLETE CBC W/AUTO DIFF WBC: CPT

## 2021-08-08 PROCEDURE — 84100 ASSAY OF PHOSPHORUS: CPT

## 2021-08-08 PROCEDURE — 36415 COLL VENOUS BLD VENIPUNCTURE: CPT

## 2021-08-08 PROCEDURE — 74011000250 HC RX REV CODE- 250: Performed by: HEALTH CARE PROVIDER

## 2021-08-08 PROCEDURE — 80053 COMPREHEN METABOLIC PANEL: CPT

## 2021-08-08 PROCEDURE — 80202 ASSAY OF VANCOMYCIN: CPT

## 2021-08-08 PROCEDURE — 85610 PROTHROMBIN TIME: CPT

## 2021-08-08 PROCEDURE — 83735 ASSAY OF MAGNESIUM: CPT

## 2021-08-08 PROCEDURE — 83605 ASSAY OF LACTIC ACID: CPT

## 2021-08-08 PROCEDURE — 74011000258 HC RX REV CODE- 258: Performed by: INTERNAL MEDICINE

## 2021-08-08 PROCEDURE — 74011250636 HC RX REV CODE- 250/636: Performed by: HEALTH CARE PROVIDER

## 2021-08-08 PROCEDURE — 84550 ASSAY OF BLOOD/URIC ACID: CPT

## 2021-08-08 PROCEDURE — 65610000006 HC RM INTENSIVE CARE

## 2021-08-08 RX ORDER — HEPARIN SODIUM 5000 [USP'U]/ML
5000 INJECTION, SOLUTION INTRAVENOUS; SUBCUTANEOUS EVERY 8 HOURS
Status: DISCONTINUED | OUTPATIENT
Start: 2021-08-08 | End: 2021-08-13 | Stop reason: HOSPADM

## 2021-08-08 RX ORDER — VANCOMYCIN HYDROCHLORIDE
1250 ONCE
Status: COMPLETED | OUTPATIENT
Start: 2021-08-08 | End: 2021-08-08

## 2021-08-08 RX ADMIN — SODIUM CHLORIDE, POTASSIUM CHLORIDE, SODIUM LACTATE AND CALCIUM CHLORIDE 100 ML/HR: 600; 310; 30; 20 INJECTION, SOLUTION INTRAVENOUS at 01:30

## 2021-08-08 RX ADMIN — VANCOMYCIN HYDROCHLORIDE 1250 MG: 10 INJECTION, POWDER, LYOPHILIZED, FOR SOLUTION INTRAVENOUS at 09:04

## 2021-08-08 RX ADMIN — TBO-FILGRASTIM 480 MCG: 480 INJECTION, SOLUTION SUBCUTANEOUS at 09:04

## 2021-08-08 RX ADMIN — FAMOTIDINE 20 MG: 10 INJECTION, SOLUTION INTRAVENOUS at 09:10

## 2021-08-08 RX ADMIN — Medication 10 ML: at 13:18

## 2021-08-08 RX ADMIN — Medication 10 ML: at 06:22

## 2021-08-08 RX ADMIN — HEPARIN SODIUM 5000 UNITS: 5000 INJECTION INTRAVENOUS; SUBCUTANEOUS at 09:11

## 2021-08-08 RX ADMIN — HEPARIN SODIUM 5000 UNITS: 5000 INJECTION INTRAVENOUS; SUBCUTANEOUS at 17:50

## 2021-08-08 RX ADMIN — SODIUM CHLORIDE, POTASSIUM CHLORIDE, SODIUM LACTATE AND CALCIUM CHLORIDE 100 ML/HR: 600; 310; 30; 20 INJECTION, SOLUTION INTRAVENOUS at 11:52

## 2021-08-08 RX ADMIN — CEFEPIME 2 G: 2 INJECTION, POWDER, FOR SOLUTION INTRAVENOUS at 04:39

## 2021-08-08 RX ADMIN — Medication 10 ML: at 22:00

## 2021-08-08 NOTE — PROGRESS NOTES
Pharmacist Note - Vancomycin Dosing  Therapy day #2  Indication: Possible sepsis of unclear etiology.  - Neutropenic, undergoing chemotherapy for gastric adenocarcinoma (diagnosed Jan '21)  Current regimen: 1.5 gm IV x 1 dose (given 8/7 @ 0456    Recent Labs     08/08/21  0449 08/07/21  2330 08/07/21  1722 08/07/21  1012 08/07/21  0426   WBC 0.7*  --   --   --  0.2*   CREA 3.67* 4.66* 5.99*   < >  --    BUN 72* 84* 94*   < >  --     < > = values in this interval not displayed. A random vancomycin level of 13.3 mcg/mL was obtained ~24 hrs post-dose. Goal target range Trough 10-15 mcg/mL      Plan: The patient's renal function is steadily improving, but Scr is still 3.67. Will continue to dose based on levels for now - a one-time dose of 1250 mg will be ordered and another 24-hr level drawn tomorrow morning. Pharmacy will continue to monitor this patient daily for changes in clinical status and renal function.

## 2021-08-08 NOTE — PROGRESS NOTES
Problem: Breathing Pattern - Ineffective  Goal: *Absence of hypoxia  Outcome: Progressing Towards Goal  Goal: *Use of effective breathing techniques  Outcome: Progressing Towards Goal     Problem: Pressure Injury - Risk of  Goal: *Prevention of pressure injury  Description: Document Jareth Scale and appropriate interventions in the flowsheet. Outcome: Progressing Towards Goal  Note: Pressure Injury Interventions:  Sensory Interventions: Assess changes in LOC, Assess need for specialty bed, Avoid rigorous massage over bony prominences, Check visual cues for pain, Float heels, Keep linens dry and wrinkle-free, Minimize linen layers, Monitor skin under medical devices, Pressure redistribution bed/mattress (bed type), Turn and reposition approx. every two hours (pillows and wedges if needed)    Moisture Interventions: Absorbent underpads, Apply protective barrier, creams and emollients, Assess need for specialty bed, Check for incontinence Q2 hours and as needed, Internal/External urinary devices, Minimize layers    Activity Interventions: Assess need for specialty bed, Pressure redistribution bed/mattress(bed type)    Mobility Interventions: Assess need for specialty bed, Float heels, HOB 30 degrees or less, Pressure redistribution bed/mattress (bed type), Turn and reposition approx. every two hours(pillow and wedges)    Nutrition Interventions: Discuss nutritional consult with provider    Friction and Shear Interventions: HOB 30 degrees or less, Minimize layers, Transferring/repositioning devices                Problem: Falls - Risk of  Goal: *Absence of Falls  Description: Document Saad Fall Risk and appropriate interventions in the flowsheet.   Outcome: Progressing Towards Goal  Note: Fall Risk Interventions:  Mobility Interventions: Communicate number of staff needed for ambulation/transfer    Mentation Interventions: Adequate sleep, hydration, pain control, Bed/chair exit alarm, Door open when patient unattended, Evaluate medications/consider consulting pharmacy, More frequent rounding, Reorient patient, Room close to nurse's station, Update white board    Medication Interventions: Evaluate medications/consider consulting pharmacy    Elimination Interventions: Bed/chair exit alarm, Call light in reach, Patient to call for help with toileting needs, Toileting schedule/hourly rounds    History of Falls Interventions: Bed/chair exit alarm, Door open when patient unattended, Evaluate medications/consider consulting pharmacy, Room close to nurse's station

## 2021-08-08 NOTE — PROGRESS NOTES
[facility]    ICU TEAM Progress Note    Name: Bethene Lesch   : 1970   MRN: [id number]   Date: 2021           ICU Assessment     1. Dehydration  2. TOM  3. Gastric cancer    Imagin2021   None today         ICU Comprehensive Plan of Care:     1. Neurological System  No issues  Spontaneous Awakening Trial: N/A  Sedation: N/A  Analgesia: Dilaudid    2. Cardiovascular System  Wean levophed  SBP Goal of: > 90 mmHg  MAP Goal of: > 65 mmHg  Norepinephrine - For above SBP/MAP goals  Transfusion Trigger (Hgb): <7 g/dL  Keep K > 4; Mg > 2     3. Respiratory System  Incentive spirometry  Spontaneous Breathing Trial: N/A  Pulmonary toilet: Incentive Spirometry   SpO2 Goal: > 92%   DVT Prophylaxis: Heparin     4. Renal/GI/Endocrine System  IVFs: [email or web address]  Ulcer Prophylaxis: Pepcid (famotidine)   Bowel Regimen: MiraLax  Feeding:  Yes soft  Blood Sugar Goal 120-180 - Glycemic Control: Insulin    5. Infectious Disease  neutrapenia  Indwelling Catheter:  Tubes: None  Lines: Central Line  Drains: Ross Catheter  D - De-escalation of Antibiotics:   Cefepime  Vancomycin  COVID Treatment:  n/a     6. PT/OT:      7. Goals of Care Discussion with family Yes     8. Plan of Care/Code Status: Full Code    9. Discussed Care Plan with Bedside RN    10.  Documentation of Current Medications    Subjective:   Progress Note: 2021      Reason for ICU Admission: TOM secondary to dehydration     HPI: refer to HPI    Overnight Events:   2021  No acute overnight events    POD:  * No surgery found *    S/P:       Active Problem List:     Problem List  Date Reviewed: 2013        Codes Class    [facility] ICD-10-CM: N17.9  ICD-9-CM: 584.9         Tobacco abuse ICD-10-CM: Z72.0  ICD-9-CM: 305.1     Overview Signed 2013  6:52 PM by Masood Melgoza MD     Cessation encouraged 2050             Primary localized osteoarthrosis, lower leg ICD-10-CM: M17.10  ICD-9-CM: 715.16 Past Medical History:      has a past medical history of Other ill-defined conditions(799.89) (13), Other ill-defined conditions(799.89), and Other ill-defined conditions(799.89) (13). He also has no past medical history of Difficult intubation, Malignant hyperthermia due to anesthesia, Nausea & vomiting, Pseudocholinesterase deficiency, or Unspecified adverse effect of anesthesia. Past Surgical History:      has a past surgical history that includes hx knee replacement; hx knee arthroscopy (Left, at age 21); hx orthopaedic (Right); and hx orthopaedic (Left, ). Home Medications:     Prior to Admission medications    Not on File       Allergies/Social/Family History:     No Known Allergies   Social History     Tobacco Use    Smoking status: Current Every Day Smoker     Packs/day: 0.50     Years: 20.00     Pack years: 10.00     Types: Cigarettes   Substance Use Topics    Alcohol use: No      No family history on file. Review of Systems:     A comprehensive review of systems was negative. Objective:   Vital Signs:  Visit Vitals  /76   Pulse (!) 107   Temp 100.2 °F (37.9 °C)   Resp 15   Ht 5' 8\" (1.727 m)   Wt 82.3 kg (181 lb 7 oz)   SpO2 97%   BMI 27.59 kg/m²    O2 Flow Rate (L/min): 2 l/min O2 Device: None (Room air) Temp (24hrs), Av.4 °F (38 °C), Min:99.7 °F (37.6 °C), Max:101.1 °F (38.4 °C)           Intake/Output:     Intake/Output Summary (Last 24 hours) at 2021 0859  Last data filed at 2021 0800  Gross per 24 hour   Intake 2880.94 ml   Output 3795 ml   Net -914.06 ml       Physical Exam:    Visit Vitals  /76   Pulse (!) 102   Temp 100.2 °F (37.9 °C)   Resp 14   Ht 5' 8\" (1.727 m)   Wt 82.3 kg (181 lb 7 oz)   SpO2 98%   BMI 27.59 kg/m²     General appearance: alert, cooperative, no distress, appears stated age  Lungs: clear to auscultation bilaterally  Heart: tachy, no m/r/g  Abdomen: soft, non-tender.  Bowel sounds normal. No masses,  no organomegaly  Neurologic: Grossly normal       LABS AND  DATA: Personally reviewed  Recent Labs     08/08/21 0449 08/07/21  0426   WBC 0.7* 0.2*   HGB 9.7* 10.2*   HCT 29.6* 30.8*    187     Recent Labs     08/08/21 0449 08/07/21  2330    144   K 3.8 3.9    108   CO2 29 28   BUN 72* 84*   CREA 3.67* 4.66*   * 120*   CA 8.7 8.6   MG 1.6 1.6   PHOS 3.9 4.2     Recent Labs     08/08/21 0449 08/07/21  2330   AP 84 88   TP 5.8* 5.8*   ALB 2.3* 2.4*   GLOB 3.5 3.4     Recent Labs     08/08/21 0449 08/07/21  0426   INR 1.5* 1.2*   PTP 15.4* 12.5*      No results for input(s): PHI, PCO2I, PO2I, FIO2I in the last 72 hours. No results for input(s): CPK, CKMB, TROIQ, BNPP in the last 72 hours. Hemodynamics:   PAP:   CO:     Wedge:   CI:     CVP:    SVR:       PVR:       Ventilator Settings:  Mode Rate Tidal Volume Pressure FiO2 PEEP                    Peak airway pressure:      Minute ventilation:          MEDS: Reviewed    Chest X-Ray:  CXR Results  (Last 48 hours)               08/07/21 0410  XR CHEST PORT Final result    Impression:  1. Low lung volumes without definite acute process. 2. A right-sided port and left IJ approach catheter. Narrative:  INDICATION: . Lines/port   Additional history:   COMPARISON: None. LIMITATIONS: Portable technique. [place] Neighbours FINDINGS: Single frontal view of the chest.    .   Lines/tubes/surgical: A right-sided port projects to terminate in the mid SVC. A   left IJ approach catheter projects to terminate in the right atrium. Heart/mediastinum: Unremarkable. Lungs/pleura: Low lung volumes without definite acute process. No visualized   pleural effusion or pneumothorax. Additional Comments: None. Alex Neighbours                 ECHO:        Multidisciplinary Rounds Completed:  Pending    ABCDEF Bundle/Checklist Completed:  Yes    SPECIAL EQUIPMENT  None    DISPOSITION  Stay in ICU    CRITICAL CARE CONSULTANT NOTE  I had a face to face encounter with the patient, reviewed and interpreted patient data including clinical events, labs, images, vital signs, I/O's, and examined patient. I have discussed the case and the plan and management of the patient's care with the consulting services, the bedside nurses and the respiratory therapist.      NOTE OF PERSONAL INVOLVEMENT IN CARE   This patient has a high probability of imminent, clinically significant deterioration, which requires the highest level of preparedness to intervene urgently. I participated in the decision-making and personally managed or directed the management of the following life and organ supporting interventions that required my frequent assessment to treat or prevent imminent deterioration. I personally spent 30 minutes of critical care time. This is time spent at this critically ill patient's bedside actively involved in patient care as well as the coordination of care. This does not include any procedural time which has been billed separately.     [address]  Staff [facility]  8/8/2021

## 2021-08-08 NOTE — PROGRESS NOTES
0730: Bedside and Verbal shift change report given to Annie Lima RN  (oncoming nurse) by Roland Ch RN (offgoing nurse). Report included the following information SBAR, Kardex, Intake/Output, MAR, Recent Results, Cardiac Rhythm Sinus Tach and Alarm Parameters . 0900: Levophed stopped. Bed swallow completed. Results normal and diet order placed per orders by Val Briceño MD.    1600: Anastasia Eng MD of patients SBP in the 150's. No new orders received. 1930: Bedside and Verbal shift change report given to Yandy Pacheco RN (oncoming nurse) by Annie Lima RN (offgoing nurse). Report included the following information SBAR, Kardex, Intake/Output, MAR, Recent Results, Cardiac Rhythm Sinus Tach and Alarm Parameters .

## 2021-08-08 NOTE — PROGRESS NOTES
1930: Bedside and Verbal shift change report given to Sal Castillo (oncoming nurse) by Ashley Meadows (offgoing nurse). Report included the following information SBAR, Kardex, ED Summary, Procedure Summary, MAR, Recent Results and Cardiac Rhythm ST.    2000: Shift assessment completed. Patient complaining of 8/10 lower back pain. Spoke with Maribell Dubois NP. New orders to be received. Patient's temp 100.9F. Patient refusing ice packs and Tylenol at this time. 2130: Spoke with patient's sister Сергей Hagen. Updated her on patient's condition, no further questions asked. 0000: Reassessment completed. 0400: Reassessment completed. 0730: Bedside and Verbal shift change report given to Ashley Meadows (oncoming nurse) by Sal Castillo (offgoing nurse). Report included the following information SBAR, Kardex, ED Summary, Procedure Summary, MAR, Recent Results and Cardiac Rhythm NSR/ST.

## 2021-08-08 NOTE — PROGRESS NOTES
NEPHROLOGY PROGRESS NOTE         Sade Casey                   JDA:[id number]   FLM:[birth year]    Chief complaints: f/u TOM    Subjective: Feels better     24 hr interval history: scr 2.5 and off Levophed on LR UOP:4L        Objective:  Vitals:    08/08/21 1030 08/08/21 1100 08/08/21 1130 08/08/21 1200   BP: 118/79 119/86 120/82 99/73   Pulse: (!) 105 100 98 67   Resp: 19 16 16 12   Temp:    99.4 °F (37.4 °C)   SpO2: 96% 98% 95% 97%   Weight:       Height:           Intake/Output Summary (Last 24 hours) at 8/8/2021 1259  Last data filed at 8/8/2021 1200  Gross per 24 hour   Intake 3150.76 ml   Output 3320 ml   Net -169.24 ml       PHYSICAL EXAM:  GENERAL : Lying down in bed with no acute distress  HEENT: AT NC PEERLA   NECK: Supple no JVP  CVS: S1 S2 RRR, no murmur or gallops heard  RS: CTABL, no rhonchi,or wheezing heard  ABDOMEN: soft NT ND positive BS  EXTREMITY: No edema clubbing or cyanosis, pedal pulse +  NEUROLOGY: AAA X3, no focal deficit or asterixis      Labs:  CBC:    Lab Results   Component Value Date/Time    WBC 0.7 (LL) 08/08/2021 04:49 AM    HGB 9.7 (L) 08/08/2021 04:49 AM    HCT 29.6 (L) 08/08/2021 04:49 AM     08/08/2021 04:49 AM     BMP:   Lab Results   Component Value Date/Time     08/08/2021 11:47 AM    K 3.4 (L) 08/08/2021 11:47 AM     (H) 08/08/2021 11:47 AM    CO2 29 08/08/2021 11:47 AM    AGAP 6 08/08/2021 11:47 AM     (H) 08/08/2021 11:47 AM    BUN 61 (H) 08/08/2021 11:47 AM    CREA 2.53 (H) 08/08/2021 11:47 AM    GFRAA 33 (L) 08/08/2021 11:47 AM    GFRNA 27 (L) 08/08/2021 11:47 AM        Lab Results   Component Value Date/Time    Color YELLOW/STRAW 08/07/2021 02:29 PM    Appearance CLEAR 08/07/2021 02:29 PM    Specific gravity 1.012 08/07/2021 02:29 PM    pH (UA) 5.0 08/07/2021 02:29 PM    Protein 30 (A) 08/07/2021 02:29 PM    Glucose Negative 08/07/2021 02:29 PM    Ketone Negative 08/07/2021 02:29 PM    Bilirubin Negative 08/07/2021 02:29 PM    Urobilinogen 0.2 08/07/2021 02:29 PM    Nitrites Negative 08/07/2021 02:29 PM    Leukocyte Esterase Negative 08/07/2021 02:29 PM    Epithelial cells FEW 08/07/2021 02:29 PM    Bacteria Negative 08/07/2021 02:29 PM    WBC 0-4 08/07/2021 02:29 PM    RBC 0-5 08/07/2021 02:29 PM       Imaging study:    Assessment &Plan    TOM d/t volume depletion   Hyperkalemia  Hypotension    Plan:  Scr 2.5 and K 3.4  Decrease IVF 60 ML/HR  BMP in am    Care Plan discussed with:   Medical Team    Ming Genao MD  8/8/2021    [facility]:  [email or web address]  [place] office:  [address] [place] [address] Night, [address] [address],[address]  Phone: [phone]  Fax :     [phone]     [place] office:  [address] [facility]  Tommy Duckworthmoshelly  Phone - [phone]  Fax - [phone]

## 2021-08-08 NOTE — PROGRESS NOTES
Famotidine - Renal dosing by Pharmacy  Current regimen:  20 mg PO Q 12 hr  Recent Labs     08/08/21  0449 08/07/21  2330 08/07/21  1722   CREA 3.67* 4.66* 5.99*   BUN 72* 84* 94*     Estimated CrCl:  25.2, CRRT on hold ml/min    Plan:   Change to 20 mg PO Q 24 hr with respect to estimated creatinine clearance (CrCl <50 mL/min, CRRT on hold) per [facility]/[employer]-approved Renal Dosing Adjustment protocol. Pharmacy will continue to monitor this patient daily for changes in clinical status and renal function. Please contact pharmacy with any questions/clarifications at .      Fernanda Costa, PharmD  Clinical Pharmacist, Orthopedics and Med/Surg  [id number] [facility] ()

## 2021-08-08 NOTE — PROGRESS NOTES
[facility] Hematology-Oncology Progress Note      Dion Jhaveri  1970  [id number]      8/8/2021  2:50 PM    Follow-up for:      [x] Chart notes since last visit reviewed     [x] Medications reviewed for allergies and interactions    Patient complains of the following:   Feels better, no emesis, pain is better, very tired    Subjective:     12 point ROS negative except as in HPI and above      Objective:     Patient Vitals for the past 24 hrs:   BP Temp Pulse Resp SpO2 Weight   08/08/21 1430 105/65 -- 100 15 96 % --   08/08/21 1400 118/83 -- (!) 103 17 94 % --   08/08/21 1330 (!) 141/92 -- 99 17 100 % --   08/08/21 1300 (!) 143/94 -- (!) 110 21 97 % --   08/08/21 1230 123/79 -- (!) 104 15 97 % --   08/08/21 1200 99/73 99.4 °F (37.4 °C) 67 12 97 % --   08/08/21 1130 120/82 -- 98 16 95 % --   08/08/21 1100 119/86 -- 100 16 98 % --   08/08/21 1030 118/79 -- (!) 105 19 96 % --   08/08/21 1000 119/75 -- (!) 108 14 100 % --   08/08/21 0930 137/73 -- (!) 110 19 100 % --   08/08/21 0900 121/76 -- (!) 102 14 98 % --   08/08/21 0830 121/76 -- (!) 107 15 97 % --   08/08/21 0800 128/88 100.2 °F (37.9 °C) (!) 104 11 96 % --   08/08/21 0700 126/86 -- (!) 101 17 97 % --   08/08/21 0600 125/75 -- 99 16 96 % 82.3 kg (181 lb 7 oz)   08/08/21 0500 110/69 -- 99 14 96 % --   08/08/21 0400 103/66 99.7 °F (37.6 °C) (!) 106 15 93 % --   08/08/21 0300 118/76 -- (!) 104 17 94 % --   08/08/21 0200 116/79 -- 99 14 93 % --   08/08/21 0100 112/72 -- (!) 105 16 94 % --   08/08/21 0000 128/79 (!) 100.6 °F (38.1 °C) (!) 108 26 95 % --   08/07/21 2300 125/75 -- (!) 106 17 95 % --   08/07/21 2200 122/83 -- (!) 108 14 95 % --   08/07/21 2100 130/84 -- (!) 105 16 96 % --   08/07/21 2000 127/83 (!) 100.9 °F (38.3 °C) (!) 101 16 96 % --   08/07/21 1930 117/83 -- (!) 103 14 97 % --   08/07/21 1900 123/80 -- (!) 114 17 97 % --   08/07/21 1830 (!) 143/86 -- (!) 113 18 98 % --   08/07/21 1800 131/87 (!) 101.1 °F (38.4 °C) (!) 109 15 99 % --   08/07/21 1730 (!) 142/87 -- (!) 109 19 100 % --   08/07/21 1700 131/80 -- (!) 105 15 98 % --   08/07/21 1630 126/80 -- (!) 109 14 99 % --   08/07/21 1600 (!) 130/102 100.2 °F (37.9 °C) (!) 112 21 99 % --   08/07/21 1530 113/82 -- (!) 108 18 97 % --   08/07/21 1515 122/77 -- (!) 107 14 99 % --   08/07/21 1500 113/72 -- (!) 108 14 97 % --       Physical Exam:  MS-Alert, or X 3  General -chronically ill appearing   Neck-Supple, No JVD  CVS-S1,S2 normal  Chest wall- Port site -looks fine  RS-CTA b/l  Abdomen- today, soft, non tender  Extre- No c/c/. 1+ edema   Neuro- No FND. Available labs reviewed:  Labs:    Recent Results (from the past 24 hour(s))   METABOLIC PANEL, COMPREHENSIVE    Collection Time: 08/07/21  5:22 PM   Result Value Ref Range    Sodium 144 136 - 145 mmol/L    Potassium 3.7 3.5 - 5.1 mmol/L    Chloride 109 (H) 97 - 108 mmol/L    CO2 28 21 - 32 mmol/L    Anion gap 7 5 - 15 mmol/L    Glucose 137 (H) 65 - 100 mg/dL    BUN 94 (H) 6 - 20 MG/DL    Creatinine 5.99 (H) 0.70 - 1.30 MG/DL    BUN/Creatinine ratio 16 12 - 20      GFR est AA 12 (L) >60 ml/min/1.73m2    GFR est non-AA 10 (L) >60 ml/min/1.73m2    Calcium 8.2 (L) 8.5 - 10.1 MG/DL    Bilirubin, total 0.4 0.2 - 1.0 MG/DL    ALT (SGPT) 12 12 - 78 U/L    AST (SGOT) 8 (L) 15 - 37 U/L    Alk.  phosphatase 87 45 - 117 U/L    Protein, total 5.6 (L) 6.4 - 8.2 g/dL    Albumin 2.3 (L) 3.5 - 5.0 g/dL    Globulin 3.3 2.0 - 4.0 g/dL    A-G Ratio 0.7 (L) 1.1 - 2.2     MAGNESIUM    Collection Time: 08/07/21  5:22 PM   Result Value Ref Range    Magnesium 1.6 1.6 - 2.4 mg/dL   PHOSPHORUS    Collection Time: 08/07/21  5:22 PM   Result Value Ref Range    Phosphorus 4.7 2.6 - 4.7 MG/DL   URIC ACID    Collection Time: 08/07/21  5:22 PM   Result Value Ref Range    Uric acid 7.1 3.5 - 7.2 MG/DL   METABOLIC PANEL, COMPREHENSIVE    Collection Time: 08/07/21 11:30 PM   Result Value Ref Range    Sodium 144 136 - 145 mmol/L    Potassium 3.9 3.5 - 5.1 mmol/L    Chloride 108 97 - 108 mmol/L    CO2 28 21 - 32 mmol/L    Anion gap 8 5 - 15 mmol/L    Glucose 120 (H) 65 - 100 mg/dL    BUN 84 (H) 6 - 20 MG/DL    Creatinine 4.66 (H) 0.70 - 1.30 MG/DL    BUN/Creatinine ratio 18 12 - 20      GFR est AA 16 (L) >60 ml/min/1.73m2    GFR est non-AA 13 (L) >60 ml/min/1.73m2    Calcium 8.6 8.5 - 10.1 MG/DL    Bilirubin, total 0.4 0.2 - 1.0 MG/DL    ALT (SGPT) 15 12 - 78 U/L    AST (SGOT) 8 (L) 15 - 37 U/L    Alk. phosphatase 88 45 - 117 U/L    Protein, total 5.8 (L) 6.4 - 8.2 g/dL    Albumin 2.4 (L) 3.5 - 5.0 g/dL    Globulin 3.4 2.0 - 4.0 g/dL    A-G Ratio 0.7 (L) 1.1 - 2.2     MAGNESIUM    Collection Time: 08/07/21 11:30 PM   Result Value Ref Range    Magnesium 1.6 1.6 - 2.4 mg/dL   PHOSPHORUS    Collection Time: 08/07/21 11:30 PM   Result Value Ref Range    Phosphorus 4.2 2.6 - 4.7 MG/DL   URIC ACID    Collection Time: 08/07/21 11:30 PM   Result Value Ref Range    Uric acid 5.4 3.5 - 7.2 MG/DL   CBC WITH AUTOMATED DIFF    Collection Time: 08/08/21  4:49 AM   Result Value Ref Range    WBC 0.7 (LL) 4.1 - 11.1 K/uL    RBC 3.19 (L) 4.10 - 5.70 M/uL    HGB 9.7 (L) 12.1 - 17.0 g/dL    HCT 29.6 (L) 36.6 - 50.3 %    MCV 92.8 80.0 - 99.0 FL    MCH 30.4 26.0 - 34.0 PG    MCHC 32.8 30.0 - 36.5 g/dL    RDW 12.1 11.5 - 14.5 %    PLATELET 806 582 - 806 K/uL    MPV 10.3 8.9 - 12.9 FL    NRBC 0.0 0  WBC    ABSOLUTE NRBC 0.00 0.00 - 0.01 K/uL    NEUTROPHILS 11 (L) 32 - 75 %    LYMPHOCYTES 60 (H) 12 - 49 %    MONOCYTES 22 (H) 5 - 13 %    EOSINOPHILS 7 0 - 7 %    BASOPHILS 0 0 - 1 %    IMMATURE GRANULOCYTES 0 %    ABS. NEUTROPHILS 0.1 (L) 1.8 - 8.0 K/UL    ABS. LYMPHOCYTES 0.4 (L) 0.8 - 3.5 K/UL    ABS. MONOCYTES 0.2 0.0 - 1.0 K/UL    ABS. EOSINOPHILS 0.0 0.0 - 0.4 K/UL    ABS. BASOPHILS 0.0 0.0 - 0.1 K/UL    ABS. IMM. GRANS. 0.0 K/UL    DF MANUAL      RBC COMMENTS NORMOCYTIC, NORMOCHROMIC      WBC COMMENTS Differential performed on buffy coat smear.      METABOLIC PANEL, COMPREHENSIVE    Collection Time: 08/08/21  4:49 AM   Result Value Ref Range    Sodium 145 136 - 145 mmol/L    Potassium 3.8 3.5 - 5.1 mmol/L    Chloride 108 97 - 108 mmol/L    CO2 29 21 - 32 mmol/L    Anion gap 8 5 - 15 mmol/L    Glucose 108 (H) 65 - 100 mg/dL    BUN 72 (H) 6 - 20 MG/DL    Creatinine 3.67 (H) 0.70 - 1.30 MG/DL    BUN/Creatinine ratio 20 12 - 20      GFR est AA 21 (L) >60 ml/min/1.73m2    GFR est non-AA 18 (L) >60 ml/min/1.73m2    Calcium 8.7 8.5 - 10.1 MG/DL    Bilirubin, total 0.4 0.2 - 1.0 MG/DL    ALT (SGPT) 16 12 - 78 U/L    AST (SGOT) 9 (L) 15 - 37 U/L    Alk.  phosphatase 84 45 - 117 U/L    Protein, total 5.8 (L) 6.4 - 8.2 g/dL    Albumin 2.3 (L) 3.5 - 5.0 g/dL    Globulin 3.5 2.0 - 4.0 g/dL    A-G Ratio 0.7 (L) 1.1 - 2.2     LACTIC ACID    Collection Time: 08/08/21  4:49 AM   Result Value Ref Range    Lactic acid 1.9 0.4 - 2.0 MMOL/L   MAGNESIUM    Collection Time: 08/08/21  4:49 AM   Result Value Ref Range    Magnesium 1.6 1.6 - 2.4 mg/dL   PHOSPHORUS    Collection Time: 08/08/21  4:49 AM   Result Value Ref Range    Phosphorus 3.9 2.6 - 4.7 MG/DL   PROTHROMBIN TIME + INR    Collection Time: 08/08/21  4:49 AM   Result Value Ref Range    INR 1.5 (H) 0.9 - 1.1      Prothrombin time 15.4 (H) 9.0 - 11.1 sec   URIC ACID    Collection Time: 08/08/21  4:49 AM   Result Value Ref Range    Uric acid 4.4 3.5 - 7.2 MG/DL   VANCOMYCIN, RANDOM    Collection Time: 08/08/21  4:49 AM   Result Value Ref Range    Vancomycin, random 73.2 UG/ML   METABOLIC PANEL, COMPREHENSIVE    Collection Time: 08/08/21 11:47 AM   Result Value Ref Range    Sodium 144 136 - 145 mmol/L    Potassium 3.4 (L) 3.5 - 5.1 mmol/L    Chloride 109 (H) 97 - 108 mmol/L    CO2 29 21 - 32 mmol/L    Anion gap 6 5 - 15 mmol/L    Glucose 104 (H) 65 - 100 mg/dL    BUN 61 (H) 6 - 20 MG/DL    Creatinine 2.53 (H) 0.70 - 1.30 MG/DL    BUN/Creatinine ratio 24 (H) 12 - 20      GFR est AA 33 (L) >60 ml/min/1.73m2    GFR est non-AA 27 (L) >60 ml/min/1.73m2    Calcium 8.1 (L) 8.5 - 10.1 MG/DL    Bilirubin, total 0.4 0.2 - 1.0 MG/DL    ALT (SGPT) 14 12 - 78 U/L    AST (SGOT) 10 (L) 15 - 37 U/L    Alk. phosphatase 80 45 - 117 U/L    Protein, total 5.4 (L) 6.4 - 8.2 g/dL    Albumin 2.1 (L) 3.5 - 5.0 g/dL    Globulin 3.3 2.0 - 4.0 g/dL    A-G Ratio 0.6 (L) 1.1 - 2.2     MAGNESIUM    Collection Time: 08/08/21 11:47 AM   Result Value Ref Range    Magnesium 1.5 (L) 1.6 - 2.4 mg/dL   PHOSPHORUS    Collection Time: 08/08/21 11:47 AM   Result Value Ref Range    Phosphorus 3.2 2.6 - 4.7 MG/DL   URIC ACID    Collection Time: 08/08/21 11:47 AM   Result Value Ref Range    Uric acid 3.4 (L) 3.5 - 7.2 MG/DL       Imaging:  CXR Results  (Last 48 hours)               08/07/21 0410  XR CHEST PORT Final result    Impression:  1. Low lung volumes without definite acute process. 2. A right-sided port and left IJ approach catheter. Narrative:  INDICATION: . Lines/port   Additional history:   COMPARISON: None. LIMITATIONS: Portable technique. Bettey Grad FINDINGS: Single frontal view of the chest.    .   Lines/tubes/surgical: A right-sided port projects to terminate in the mid SVC. A   left IJ approach catheter projects to terminate in the right atrium. Heart/mediastinum: Unremarkable. Lungs/pleura: Low lung volumes without definite acute process. No visualized   pleural effusion or pneumothorax. Additional Comments: None. .           CT Results  (Last 48 hours)               08/07/21 1556  CT CHEST WO CONT Final result    Impression:  1. No CT explanation for the patient's sepsis. 2. Incidental findings as above           Narrative:  EXAM:  CT CHEST WO CONT, CT ABD PELV WO CONT   INDICATION:  fevers, sepsis, gastric cancer on chemo->? source. Additional history:   COMPARISON: None. .   TECHNIQUE:    Multislice helical CT was performed from the thoracic inlet to the pubic   symphysis without intravenous contrast administration.  Contiguous 5 mm axial   images were reconstructed and lung and soft tissue windows were generated. Coronal and sagittal reformations were generated. CT dose reduction was achieved through use of a standardized protocol tailored   for this examination and automatic exposure control for dose modulation. Clifford Couch FINDINGS:   CHEST:   Chest wall/thoracic inlet: Bilateral gynecomastia. Thyroid: Within normal limits. Mediastinum/gilberto: Within normal limits. Heart/vessels: Left IJ approach catheter terminates in the right atrium. Right   IJ approach catheter from a right-sided port terminates in the distal SVC. Calcifications in the coronary arteries. Lungs/Pleura: Within normal limits. .   ABDOMEN:   Liver: Within normal limits. Gallbladder/Biliary: Distended gallbladder. The common bile duct is   imperceptible, not distended   Spleen: Within normal limits. Pancreas: Within normal limits. Adrenals: Within normal limits. Kidneys: Within normal limits. Peritoneum/Mesenteries: Within normal limits. Extraperitoneum: Nonspecific stranding in the retroperitoneum in the upper   abdomen. Gastrointestinal tract: Within normal limits. Normal-appearing appendix. Vascular: Calcifications in the aorta. Clifford Couch PELVIS:   Extraperitoneum: Within normal limits. Ureters: Within normal limits. Bladder: Ross catheter in a decompressed bladder. There is a loculated gas   within the bladder, likely iatrogenic   Reproductive System: Calcifications in the prostate. .   MSK:    Degenerative change in the spine. Bilateral, L5 pars defects and grade 1   anterior listhesis of L5 on S1. Generative disc disease from L2-S1. Levoscoliosis of the lumbar spine   . 08/07/21 1556  CT ABD PELV WO CONT Final result    Impression:  1. No CT explanation for the patient's sepsis. 2. Incidental findings as above           Narrative:  EXAM:  CT CHEST WO CONT, CT ABD PELV WO CONT   INDICATION:  fevers, sepsis, gastric cancer on chemo->? source. Additional history:   COMPARISON: None.    .   TECHNIQUE: Multislice helical CT was performed from the thoracic inlet to the pubic   symphysis without intravenous contrast administration. Contiguous 5 mm axial   images were reconstructed and lung and soft tissue windows were generated. Coronal and sagittal reformations were generated. CT dose reduction was achieved through use of a standardized protocol tailored   for this examination and automatic exposure control for dose modulation. Breana Sida FINDINGS:   CHEST:   Chest wall/thoracic inlet: Bilateral gynecomastia. Thyroid: Within normal limits. Mediastinum/gilberto: Within normal limits. Heart/vessels: Left IJ approach catheter terminates in the right atrium. Right   IJ approach catheter from a right-sided port terminates in the distal SVC. Calcifications in the coronary arteries. Lungs/Pleura: Within normal limits. .   ABDOMEN:   Liver: Within normal limits. Gallbladder/Biliary: Distended gallbladder. The common bile duct is   imperceptible, not distended   Spleen: Within normal limits. Pancreas: Within normal limits. Adrenals: Within normal limits. Kidneys: Within normal limits. Peritoneum/Mesenteries: Within normal limits. Extraperitoneum: Nonspecific stranding in the retroperitoneum in the upper   abdomen. Gastrointestinal tract: Within normal limits. Normal-appearing appendix. Vascular: Calcifications in the aorta. Breana Sida PELVIS:   Extraperitoneum: Within normal limits. Ureters: Within normal limits. Bladder: Ross catheter in a decompressed bladder. There is a loculated gas   within the bladder, likely iatrogenic   Reproductive System: Calcifications in the prostate. .   MSK:    Degenerative change in the spine. Bilateral, L5 pars defects and grade 1   anterior listhesis of L5 on S1. Generative disc disease from L2-S1. Levoscoliosis of the lumbar spine   .                Assessment/Plan:     *) Gastric Adenocarcinoma- metastatic, HER2 and PD-L1 negative, MMR intact.   --OSH - Started on [facility] for initially neoadjuvant chemo (could not tolerate docetaxel)- initial intent was curative, but  Disease progression in June 2021, pt delayed initiation of chemotherapy  --c1d1 folfiri - No pegfilgrastim -7/27/21   --Hold further treatment till clinically stable  and per primary oncologist in [facility] oncology practice)  --Reviewed CT CAP -no iv con- no acute findings of obstruction or other. Hard to interpret adenopathy on a non- iv con scan ( scan from June 2021 - Outside scan- had shown extensive adenopathy outside of the stomach)     *)Neutropenic Sepsis  ---Fever cureves are better  --s/p 1 dose vanco and on q24h Cefepime -  -- Follow  bd cultures and urine culture , cxr- unrevealing  --Cont Cefepime - follow cultures,   --Neutropenic precautions -cont  filgrastim 480 mcg sc daily-(no pegfilgrastim used)   --wbc up from 0.2 to 0.7   --Watch daily cbc     *)Intractable n/v and d - after folfiri, although similar but milder symptoms started prior to chemotherapy, per sister. ---Likely chemo related, and ct a/p- ruled out gastric outlet obstruction/ DOMINIC ( abdo soft but tender )   --tolerating applesauce today and drinking fluids     *)Severe TOM  ---Per nephrology, ATN( from profound , prolonged n/v and diarrhea)  +/- TLS ( would be very unlikely in gastric adenoca)  ---creat, K and U/O is getting better, No CRRT required  ---was given 1 dose rasburicase -8/7/21-per nephrology     *)Pain management  --Was  Followed by palliative care medicine there- on oxycodone 5 mg po q4hr and on MS contin 15 mg po bid, which per his sister, he was trying to take.  Control pain aggressively     D/w RN, pt and sister

## 2021-08-09 LAB
ALBUMIN SERPL-MCNC: 2.1 G/DL (ref 3.5–5)
ALBUMIN/GLOB SERPL: 0.7 {RATIO} (ref 1.1–2.2)
ALP SERPL-CCNC: 74 U/L (ref 45–117)
ALT SERPL-CCNC: 20 U/L (ref 12–78)
ANION GAP SERPL CALC-SCNC: 4 MMOL/L (ref 5–15)
AST SERPL-CCNC: 12 U/L (ref 15–37)
BASOPHILS # BLD: 0 K/UL (ref 0–0.1)
BASOPHILS NFR BLD: 0 % (ref 0–1)
BILIRUB SERPL-MCNC: 0.4 MG/DL (ref 0.2–1)
BUN SERPL-MCNC: 41 MG/DL (ref 6–20)
BUN/CREAT SERPL: 24 (ref 12–20)
C DIFF GDH STL QL: NEGATIVE
C DIFF TOX A+B STL QL IA: NEGATIVE
CALCIUM SERPL-MCNC: 8.2 MG/DL (ref 8.5–10.1)
CHLORIDE SERPL-SCNC: 108 MMOL/L (ref 97–108)
CO2 SERPL-SCNC: 30 MMOL/L (ref 21–32)
CREAT SERPL-MCNC: 1.73 MG/DL (ref 0.7–1.3)
DIFFERENTIAL METHOD BLD: ABNORMAL
EOSINOPHIL # BLD: 0.1 K/UL (ref 0–0.4)
EOSINOPHIL NFR BLD: 6 % (ref 0–7)
ERYTHROCYTE [DISTWIDTH] IN BLOOD BY AUTOMATED COUNT: 12 % (ref 11.5–14.5)
GLOBULIN SER CALC-MCNC: 3.2 G/DL (ref 2–4)
GLUCOSE SERPL-MCNC: 107 MG/DL (ref 65–100)
HCT VFR BLD AUTO: 25.9 % (ref 36.6–50.3)
HGB BLD-MCNC: 8.9 G/DL (ref 12.1–17)
IMM GRANULOCYTES # BLD AUTO: 0 K/UL
IMM GRANULOCYTES NFR BLD AUTO: 0 %
INR PPP: 1.3 (ref 0.9–1.1)
INTERPRETATION: NORMAL
LACTATE SERPL-SCNC: 1.2 MMOL/L (ref 0.4–2)
LYMPHOCYTES # BLD: 0.8 K/UL (ref 0.8–3.5)
LYMPHOCYTES NFR BLD: 57 % (ref 12–49)
MAGNESIUM SERPL-MCNC: 1.3 MG/DL (ref 1.6–2.4)
MCH RBC QN AUTO: 31.4 PG (ref 26–34)
MCHC RBC AUTO-ENTMCNC: 34.4 G/DL (ref 30–36.5)
MCV RBC AUTO: 91.5 FL (ref 80–99)
MONOCYTES # BLD: 0.1 K/UL (ref 0–1)
MONOCYTES NFR BLD: 11 % (ref 5–13)
NEUTS BAND NFR BLD MANUAL: 2 % (ref 0–6)
NEUTS SEG # BLD: 0.3 K/UL (ref 1.8–8)
NEUTS SEG NFR BLD: 24 % (ref 32–75)
NRBC # BLD: 0 K/UL (ref 0–0.01)
NRBC BLD-RTO: 0 PER 100 WBC
PHOSPHATE SERPL-MCNC: 1.8 MG/DL (ref 2.6–4.7)
PLATELET # BLD AUTO: 160 K/UL (ref 150–400)
PLATELET COMMENTS,PCOM: ABNORMAL
PMV BLD AUTO: 10.5 FL (ref 8.9–12.9)
POTASSIUM SERPL-SCNC: 3.1 MMOL/L (ref 3.5–5.1)
PROT SERPL-MCNC: 5.3 G/DL (ref 6.4–8.2)
PROTHROMBIN TIME: 13.5 SEC (ref 9–11.1)
RBC # BLD AUTO: 2.83 M/UL (ref 4.1–5.7)
RBC MORPH BLD: ABNORMAL
SODIUM SERPL-SCNC: 142 MMOL/L (ref 136–145)
URATE SERPL-MCNC: 2.4 MG/DL (ref 3.5–7.2)
VANCOMYCIN SERPL-MCNC: 12.9 UG/ML
WBC # BLD AUTO: 1.3 K/UL (ref 4.1–11.1)

## 2021-08-09 PROCEDURE — 85610 PROTHROMBIN TIME: CPT

## 2021-08-09 PROCEDURE — 80053 COMPREHEN METABOLIC PANEL: CPT

## 2021-08-09 PROCEDURE — 85025 COMPLETE CBC W/AUTO DIFF WBC: CPT

## 2021-08-09 PROCEDURE — 74011000250 HC RX REV CODE- 250: Performed by: HEALTH CARE PROVIDER

## 2021-08-09 PROCEDURE — 74011250636 HC RX REV CODE- 250/636: Performed by: INTERNAL MEDICINE

## 2021-08-09 PROCEDURE — 74011000250 HC RX REV CODE- 250: Performed by: NURSE PRACTITIONER

## 2021-08-09 PROCEDURE — 83735 ASSAY OF MAGNESIUM: CPT

## 2021-08-09 PROCEDURE — 80202 ASSAY OF VANCOMYCIN: CPT

## 2021-08-09 PROCEDURE — 83605 ASSAY OF LACTIC ACID: CPT

## 2021-08-09 PROCEDURE — 84100 ASSAY OF PHOSPHORUS: CPT

## 2021-08-09 PROCEDURE — 74011000258 HC RX REV CODE- 258: Performed by: INTERNAL MEDICINE

## 2021-08-09 PROCEDURE — 74011250637 HC RX REV CODE- 250/637: Performed by: HEALTH CARE PROVIDER

## 2021-08-09 PROCEDURE — 84550 ASSAY OF BLOOD/URIC ACID: CPT

## 2021-08-09 PROCEDURE — 74011000250 HC RX REV CODE- 250

## 2021-08-09 PROCEDURE — 74011250636 HC RX REV CODE- 250/636: Performed by: HEALTH CARE PROVIDER

## 2021-08-09 PROCEDURE — 74011000250 HC RX REV CODE- 250: Performed by: INTERNAL MEDICINE

## 2021-08-09 PROCEDURE — 87324 CLOSTRIDIUM AG IA: CPT

## 2021-08-09 PROCEDURE — 65270000029 HC RM PRIVATE

## 2021-08-09 PROCEDURE — 74011250636 HC RX REV CODE- 250/636: Performed by: NURSE PRACTITIONER

## 2021-08-09 PROCEDURE — 36415 COLL VENOUS BLD VENIPUNCTURE: CPT

## 2021-08-09 RX ORDER — VANCOMYCIN HYDROCHLORIDE
1250 ONCE
Status: COMPLETED | OUTPATIENT
Start: 2021-08-09 | End: 2021-08-09

## 2021-08-09 RX ORDER — POTASSIUM CHLORIDE 7.45 MG/ML
10 INJECTION INTRAVENOUS
Status: DISCONTINUED | OUTPATIENT
Start: 2021-08-09 | End: 2021-08-09

## 2021-08-09 RX ORDER — MAGNESIUM SULFATE HEPTAHYDRATE 40 MG/ML
2 INJECTION, SOLUTION INTRAVENOUS ONCE
Status: COMPLETED | OUTPATIENT
Start: 2021-08-09 | End: 2021-08-09

## 2021-08-09 RX ORDER — BACITRACIN 500 UNIT/G
PACKET (EA) TOPICAL
Status: COMPLETED
Start: 2021-08-09 | End: 2021-08-09

## 2021-08-09 RX ADMIN — POTASSIUM BICARBONATE 40 MEQ: 782 TABLET, EFFERVESCENT ORAL at 10:34

## 2021-08-09 RX ADMIN — Medication 10 ML: at 13:29

## 2021-08-09 RX ADMIN — VANCOMYCIN HYDROCHLORIDE 1250 MG: 10 INJECTION, POWDER, LYOPHILIZED, FOR SOLUTION INTRAVENOUS at 11:29

## 2021-08-09 RX ADMIN — SODIUM PHOSPHATE, MONOBASIC, MONOHYDRATE: 276; 142 INJECTION, SOLUTION INTRAVENOUS at 15:55

## 2021-08-09 RX ADMIN — MAGNESIUM SULFATE HEPTAHYDRATE 2 G: 40 INJECTION, SOLUTION INTRAVENOUS at 14:34

## 2021-08-09 RX ADMIN — TBO-FILGRASTIM 480 MCG: 480 INJECTION, SOLUTION SUBCUTANEOUS at 09:30

## 2021-08-09 RX ADMIN — CEFEPIME 2 G: 2 INJECTION, POWDER, FOR SOLUTION INTRAVENOUS at 05:00

## 2021-08-09 RX ADMIN — POTASSIUM BICARBONATE 40 MEQ: 782 TABLET, EFFERVESCENT ORAL at 18:02

## 2021-08-09 RX ADMIN — FAMOTIDINE 20 MG: 10 INJECTION, SOLUTION INTRAVENOUS at 09:31

## 2021-08-09 RX ADMIN — HEPARIN SODIUM 5000 UNITS: 5000 INJECTION INTRAVENOUS; SUBCUTANEOUS at 01:57

## 2021-08-09 RX ADMIN — HEPARIN SODIUM 5000 UNITS: 5000 INJECTION INTRAVENOUS; SUBCUTANEOUS at 10:12

## 2021-08-09 RX ADMIN — FAMOTIDINE 20 MG: 10 INJECTION, SOLUTION INTRAVENOUS at 20:53

## 2021-08-09 RX ADMIN — SODIUM CHLORIDE, POTASSIUM CHLORIDE, SODIUM LACTATE AND CALCIUM CHLORIDE 100 ML/HR: 600; 310; 30; 20 INJECTION, SOLUTION INTRAVENOUS at 08:09

## 2021-08-09 RX ADMIN — Medication 10 ML: at 06:00

## 2021-08-09 RX ADMIN — CEFEPIME HYDROCHLORIDE 2 G: 2 INJECTION, POWDER, FOR SOLUTION INTRAVENOUS at 18:02

## 2021-08-09 RX ADMIN — Medication 10 ML: at 20:53

## 2021-08-09 RX ADMIN — BACITRACIN 1 PACKET: 500 OINTMENT TOPICAL at 13:28

## 2021-08-09 RX ADMIN — HEPARIN SODIUM 5000 UNITS: 5000 INJECTION INTRAVENOUS; SUBCUTANEOUS at 18:02

## 2021-08-09 NOTE — PROGRESS NOTES
Renal Dosing/Monitoring  Medication: Cefepime   Current regimen:  2gm IV every 24 hr  Recent Labs     08/09/21  0226 08/08/21  1147 08/08/21  0449   CREA 1.73* 2.53* 3.67*   BUN 41* 61* 72*     Estimated CrCl:  54 ml/min  Plan: Change to 2 gm IV q12h for crcl 30-60 per renal dosing protocol. Additional changes:  Pepcid adjusted to 20 mg q12h.

## 2021-08-09 NOTE — PROGRESS NOTES
0730 Bedside and Verbal shift change report given to Can Aguilar (oncoming nurse) by Homer Jimenez RN (offgoing nurse). Report included the following information SBAR, Kardex, ED Summary, Intake/Output, MAR, Recent Results and Cardiac Rhythm sinus tach. 1700: Called report to ALEX Garcia on [facility]    SHIFT SUMMARY:Pt alert and oriented x4. UO ranged 60-150mL/hr. Pt had multiple bowel movements throughout shift.

## 2021-08-09 NOTE — PROGRESS NOTES
[facility] Adult  Hospitalist Group     ICU Transfer/Accept Summary     [place] [address] [place]  DATE OF TRANSFER: 8/9/2021       PATIENT ID: Felipe Orosco  MRN: [id number]   YOB: 1970    PRIMARY CARE PROVIDER: None   DATE OF ADMISSION: 8/7/2021  2:55 AM    ATTENDING PHYSICIAN: Monserrat Gupta NP  CONSULTATIONS:   IP CONSULT TO ONCOLOGY  IP CONSULT TO NEPHROLOGY    PROCEDURES/SURGERIES:   * No surgery found *    REASON FOR ADMISSION: <principal problem not specified>     HOSPITAL PROBLEM LIST:  Patient Active Problem List   Diagnosis Code    Primary localized osteoarthrosis, lower leg M17.10    Tobacco abuse Z72.0    TOM (acute kidney injury) ([facility] [email or web address]ca [email or web address].) N17.9         Brief HPI and Hospital Course:    HPI: 52M w/ hx of gastric adenocarcinoma diagnosed in January 2021, being seen at [facility] oncology under going chemotherapy, last dose of CHEMO FOLFIRI on 7/27/2021, also notable for abdominal lymphadenopathy, HLD, GERD, chronic pain on home opioid medication (oxycodone 10-15 q4h MS contin 15mg BID) presented to [address] [place] [address] for 4 days of nausea/vomiting, patient was noted to have Cr of 19.2, , K 6.6, patient received 6L of NS then started on D51/4 saline. Patient was also given Veltassa for hyperkalemia. On arrival to [facility] ICU patient is alert and awake, tachycardic in the 140's with norepinephrine at 16 mcg/min. Nephrology consulted to initiate CRRT. Pt c/o nausea/abdominal tenderness. Hospitalist note:   08/09: Seen and examined patient sitting up in bed. States that he is feeling a little better. Having diarrhea every 1-2 hours, watery. Assessment and Plan:    TOM   - Likely prerenal   - Improving creatinine 1.73 (11.90 on admission)   - On dose of rasburicase given. - Continue IV fluids   - Nephrology following- Tatea Crews to be removed today.      Intractable n/v/d  - Continue IV fluids   - Continue antiemetics  - C- diff pending Hypovolemia   - Likely secondary to n/v/d   - Continue IV fluids     Hypokalemia   - Potassium 3.1  - replaced  - Monitor labs and replace as needed    Hypomagnesemia  - Likely secondary to volume depletion   - Mag 1.3  - Replace   - Monitor labs and replace as needed     Hypophosphatemia   - Phosphorus 1.8  - Replace   - Monitor labs and replace as needed     Neutropenic sepsis   - Improving 0.7-> 1.3  - WBC improving   - BC- NGTD x 2 days    - Continue IV antibiotics     Gastric adenocarcinoma- mets   - Oncology following   - Pain control with PCA hydromorphone   - Consult palliative- to assist with pain control              PHYSICAL EXAMINATION:  Visit Vitals  BP (!) 153/114   Pulse (!) 104   Temp 99.7 °F (37.6 °C)   Resp 15   Ht 5' 8\" (1.727 m)   Wt 82.6 kg (182 lb 1.6 oz)   SpO2 97%   BMI 27.69 kg/m²       General:          Alert, cooperative, no distress  HEENT:           Atraumatic, MMM            Neck:               Supple, symmetrical,  thyroid: non tender  Lungs:             Clear to auscultation bilaterally. No Wheezing or Rhonchi. No rales. Heart:              Regular  rhythm,  No  murmur   No edema  Abdomen:       Soft, non-tender. Not distended. Bowel sounds normal  Extremities:     No cyanosis. No clubbing,  +2 distal pulses  Skin:                Not pale. Not Jaundiced  No rashes   Psych:             Not anxious or agitated.   Neurologic:      Alert, moves all extremities, oriented X 3.     CODE STATUS:  X Full Code    DNR    Partial    Comfort Care     Signed:   Odell Ellis NP  Date of Service:  8/9/2021  11:21 AM

## 2021-08-09 NOTE — PROGRESS NOTES
I Hematology-Oncology Progress Note      Liam Ambrosio  1970  [id number]      8/9/2021  2:50 PM    Follow-up for:      [x] Chart notes since last visit reviewed     [x] Medications reviewed for allergies and interactions    Patient complains of the following:   Feels better, no emesis, pain is better,c/o his feet hurting    Subjective:     12 point ROS negative except as in HPI and above      Objective:     Patient Vitals for the past 24 hrs:   BP Temp Pulse Resp SpO2 Weight   08/09/21 0800 -- 99.7 °F (37.6 °C) (!) 101 15 98 % --   08/09/21 0625 -- 99.8 °F (37.7 °C) -- -- -- --   08/09/21 0251 -- -- -- -- -- 82.6 kg (182 lb 1.6 oz)   08/09/21 0145 -- -- (!) 107 18 97 % --   08/09/21 0130 -- -- (!) 109 17 97 % --   08/09/21 0115 -- -- (!) 103 13 98 % --   08/09/21 0100 134/82 -- (!) 108 22 99 % --   08/09/21 0045 -- -- 93 13 98 % --   08/09/21 0030 -- -- (!) 115 27 100 % --   08/09/21 0015 -- -- (!) 117 16 96 % --   08/09/21 0000 126/79 -- (!) 108 20 97 % --   08/08/21 2345 -- -- (!) 103 19 99 % --   08/08/21 2330 -- -- (!) 105 14 98 % --   08/08/21 2315 -- -- (!) 107 16 98 % --   08/08/21 2300 127/83 -- (!) 104 18 99 % --   08/08/21 2245 -- -- (!) 108 15 98 % --   08/08/21 2200 121/79 -- (!) 101 15 98 % --   08/08/21 2145 -- -- (!) 110 16 98 % --   08/08/21 2130 -- -- (!) 101 15 99 % --   08/08/21 2115 -- -- (!) 111 21 99 % --   08/08/21 2100 123/84 -- (!) 116 20 100 % --   08/08/21 2045 -- -- (!) 106 16 97 % --   08/08/21 2030 -- -- (!) 127 20 100 % --   08/08/21 2015 -- -- (!) 107 21 97 % --   08/08/21 2000 131/88 100.4 °F (38 °C) (!) 111 23 98 % --   08/08/21 1945 (!) 151/98 -- (!) 105 14 95 % --   08/08/21 1930 (!) 145/90 -- (!) 102 12 97 % --   08/08/21 1900 (!) 133/100 -- (!) 108 21 98 % --   08/08/21 1830 107/74 -- 99 13 97 % --   08/08/21 1800 (!) 109/59 -- 95 13 100 % --   08/08/21 1730 (!) 146/84 -- 90 10 98 % --   08/08/21 1700 126/77 -- (!) 103 20 99 % --   08/08/21 1630 (!) 164/99 -- (!) 124 20 100 % --   08/08/21 1600 (!) 150/97 99.9 °F (37.7 °C) (!) 106 23 100 % --   08/08/21 1530 (!) 120/99 -- (!) 108 20 97 % --   08/08/21 1500 133/83 -- (!) 102 12 95 % --   08/08/21 1430 105/65 -- 100 15 96 % --   08/08/21 1400 118/83 -- (!) 103 17 94 % --   08/08/21 1330 (!) 141/92 -- 99 17 100 % --   08/08/21 1300 (!) 143/94 -- (!) 110 21 97 % --   08/08/21 1230 123/79 -- (!) 104 15 97 % --   08/08/21 1200 99/73 99.4 °F (37.4 °C) 67 12 97 % --   08/08/21 1130 120/82 -- 98 16 95 % --   08/08/21 1100 119/86 -- 100 16 98 % --   08/08/21 1030 118/79 -- (!) 105 19 96 % --   08/08/21 1000 119/75 -- (!) 108 14 100 % --       Physical Exam:  MS-Alert, or X 3  General -chronically ill appearing   Neck-Supple, No JVD  CVS-S1,S2 normal  Chest wall- Port site -looks fine  RS-CTA b/l  Abdomen- today, soft, non tender  Extre- No c/c/. 1+ edema   Neuro- No FND. Available labs reviewed:  Labs:    Recent Results (from the past 24 hour(s))   METABOLIC PANEL, COMPREHENSIVE    Collection Time: 08/08/21 11:47 AM   Result Value Ref Range    Sodium 144 136 - 145 mmol/L    Potassium 3.4 (L) 3.5 - 5.1 mmol/L    Chloride 109 (H) 97 - 108 mmol/L    CO2 29 21 - 32 mmol/L    Anion gap 6 5 - 15 mmol/L    Glucose 104 (H) 65 - 100 mg/dL    BUN 61 (H) 6 - 20 MG/DL    Creatinine 2.53 (H) 0.70 - 1.30 MG/DL    BUN/Creatinine ratio 24 (H) 12 - 20      GFR est AA 33 (L) >60 ml/min/1.73m2    GFR est non-AA 27 (L) >60 ml/min/1.73m2    Calcium 8.1 (L) 8.5 - 10.1 MG/DL    Bilirubin, total 0.4 0.2 - 1.0 MG/DL    ALT (SGPT) 14 12 - 78 U/L    AST (SGOT) 10 (L) 15 - 37 U/L    Alk.  phosphatase 80 45 - 117 U/L    Protein, total 5.4 (L) 6.4 - 8.2 g/dL    Albumin 2.1 (L) 3.5 - 5.0 g/dL    Globulin 3.3 2.0 - 4.0 g/dL    A-G Ratio 0.6 (L) 1.1 - 2.2     MAGNESIUM    Collection Time: 08/08/21 11:47 AM   Result Value Ref Range    Magnesium 1.5 (L) 1.6 - 2.4 mg/dL   PHOSPHORUS    Collection Time: 08/08/21 11:47 AM   Result Value Ref Range    Phosphorus 3.2 2.6 - 4.7 MG/DL URIC ACID    Collection Time: 08/08/21 11:47 AM   Result Value Ref Range    Uric acid 3.4 (L) 3.5 - 7.2 MG/DL   CBC WITH AUTOMATED DIFF    Collection Time: 08/09/21  2:26 AM   Result Value Ref Range    WBC 1.3 (L) 4.1 - 11.1 K/uL    RBC 2.83 (L) 4.10 - 5.70 M/uL    HGB 8.9 (L) 12.1 - 17.0 g/dL    HCT 25.9 (L) 36.6 - 50.3 %    MCV 91.5 80.0 - 99.0 FL    MCH 31.4 26.0 - 34.0 PG    MCHC 34.4 30.0 - 36.5 g/dL    RDW 12.0 11.5 - 14.5 %    PLATELET 141 259 - 006 K/uL    MPV 10.5 8.9 - 12.9 FL    NRBC 0.0 0  WBC    ABSOLUTE NRBC 0.00 0.00 - 0.01 K/uL    NEUTROPHILS 24 (L) 32 - 75 %    BAND NEUTROPHILS 2 0 - 6 %    LYMPHOCYTES 57 (H) 12 - 49 %    MONOCYTES 11 5 - 13 %    EOSINOPHILS 6 0 - 7 %    BASOPHILS 0 0 - 1 %    IMMATURE GRANULOCYTES 0 %    ABS. NEUTROPHILS 0.3 (L) 1.8 - 8.0 K/UL    ABS. LYMPHOCYTES 0.8 0.8 - 3.5 K/UL    ABS. MONOCYTES 0.1 0.0 - 1.0 K/UL    ABS. EOSINOPHILS 0.1 0.0 - 0.4 K/UL    ABS. BASOPHILS 0.0 0.0 - 0.1 K/UL    ABS. IMM.  GRANS. 0.0 K/UL    DF MANUAL      PLATELET COMMENTS Large Platelets      RBC COMMENTS OVALOCYTES  PRESENT       LACTIC ACID    Collection Time: 08/09/21  2:26 AM   Result Value Ref Range    Lactic acid 1.2 0.4 - 2.0 MMOL/L   PROTHROMBIN TIME + INR    Collection Time: 08/09/21  2:26 AM   Result Value Ref Range    INR 1.3 (H) 0.9 - 1.1      Prothrombin time 13.5 (H) 9.0 - 12.1 sec   METABOLIC PANEL, COMPREHENSIVE    Collection Time: 08/09/21  2:26 AM   Result Value Ref Range    Sodium 142 136 - 145 mmol/L    Potassium 3.1 (L) 3.5 - 5.1 mmol/L    Chloride 108 97 - 108 mmol/L    CO2 30 21 - 32 mmol/L    Anion gap 4 (L) 5 - 15 mmol/L    Glucose 107 (H) 65 - 100 mg/dL    BUN 41 (H) 6 - 20 MG/DL    Creatinine 1.73 (H) 0.70 - 1.30 MG/DL    BUN/Creatinine ratio 24 (H) 12 - 20      GFR est AA 51 (L) >60 ml/min/1.73m2    GFR est non-AA 42 (L) >60 ml/min/1.73m2    Calcium 8.2 (L) 8.5 - 10.1 MG/DL    Bilirubin, total 0.4 0.2 - 1.0 MG/DL    ALT (SGPT) 20 12 - 78 U/L    AST (SGOT) 12 (L) 15 - 37 U/L    Alk. phosphatase 74 45 - 117 U/L    Protein, total 5.3 (L) 6.4 - 8.2 g/dL    Albumin 2.1 (L) 3.5 - 5.0 g/dL    Globulin 3.2 2.0 - 4.0 g/dL    A-G Ratio 0.7 (L) 1.1 - 2.2     MAGNESIUM    Collection Time: 08/09/21  2:26 AM   Result Value Ref Range    Magnesium 1.3 (L) 1.6 - 2.4 mg/dL   PHOSPHORUS    Collection Time: 08/09/21  2:26 AM   Result Value Ref Range    Phosphorus 1.8 (L) 2.6 - 4.7 MG/DL   URIC ACID    Collection Time: 08/09/21  2:26 AM   Result Value Ref Range    Uric acid 2.4 (L) 3.5 - 7.2 MG/DL       Imaging:  CXR Results  (Last 48 hours)    None        CT Results  (Last 48 hours)               08/07/21 1556  CT CHEST WO CONT Final result    Impression:  1. No CT explanation for the patient's sepsis. 2. Incidental findings as above           Narrative:  EXAM:  CT CHEST WO CONT, CT ABD PELV WO CONT   INDICATION:  fevers, sepsis, gastric cancer on chemo->? source. Additional history:   COMPARISON: None. .   TECHNIQUE:    Multislice helical CT was performed from the thoracic inlet to the pubic   symphysis without intravenous contrast administration. Contiguous 5 mm axial   images were reconstructed and lung and soft tissue windows were generated. Coronal and sagittal reformations were generated. CT dose reduction was achieved through use of a standardized protocol tailored   for this examination and automatic exposure control for dose modulation. Yan Bruner FINDINGS:   CHEST:   Chest wall/thoracic inlet: Bilateral gynecomastia. Thyroid: Within normal limits. Mediastinum/gilberto: Within normal limits. Heart/vessels: Left IJ approach catheter terminates in the right atrium. Right   IJ approach catheter from a right-sided port terminates in the distal SVC. Calcifications in the coronary arteries. Lungs/Pleura: Within normal limits. .   ABDOMEN:   Liver: Within normal limits. Gallbladder/Biliary: Distended gallbladder.  The common bile duct is   imperceptible, not distended Spleen: Within normal limits. Pancreas: Within normal limits. Adrenals: Within normal limits. Kidneys: Within normal limits. Peritoneum/Mesenteries: Within normal limits. Extraperitoneum: Nonspecific stranding in the retroperitoneum in the upper   abdomen. Gastrointestinal tract: Within normal limits. Normal-appearing appendix. Vascular: Calcifications in the aorta. Fina Linger PELVIS:   Extraperitoneum: Within normal limits. Ureters: Within normal limits. Bladder: Ross catheter in a decompressed bladder. There is a loculated gas   within the bladder, likely iatrogenic   Reproductive System: Calcifications in the prostate. .   MSK:    Degenerative change in the spine. Bilateral, L5 pars defects and grade 1   anterior listhesis of L5 on S1. Generative disc disease from L2-S1. Levoscoliosis of the lumbar spine   . 08/07/21 1556  CT ABD PELV WO CONT Final result    Impression:  1. No CT explanation for the patient's sepsis. 2. Incidental findings as above           Narrative:  EXAM:  CT CHEST WO CONT, CT ABD PELV WO CONT   INDICATION:  fevers, sepsis, gastric cancer on chemo->? source. Additional history:   COMPARISON: None. .   TECHNIQUE:    Multislice helical CT was performed from the thoracic inlet to the pubic   symphysis without intravenous contrast administration. Contiguous 5 mm axial   images were reconstructed and lung and soft tissue windows were generated. Coronal and sagittal reformations were generated. CT dose reduction was achieved through use of a standardized protocol tailored   for this examination and automatic exposure control for dose modulation. Fina Mcgarry FINDINGS:   CHEST:   Chest wall/thoracic inlet: Bilateral gynecomastia. Thyroid: Within normal limits. Mediastinum/gilberto: Within normal limits. Heart/vessels: Left IJ approach catheter terminates in the right atrium. Right   IJ approach catheter from a right-sided port terminates in the distal SVC. Calcifications in the coronary arteries. Lungs/Pleura: Within normal limits. .   ABDOMEN:   Liver: Within normal limits. Gallbladder/Biliary: Distended gallbladder. The common bile duct is   imperceptible, not distended   Spleen: Within normal limits. Pancreas: Within normal limits. Adrenals: Within normal limits. Kidneys: Within normal limits. Peritoneum/Mesenteries: Within normal limits. Extraperitoneum: Nonspecific stranding in the retroperitoneum in the upper   abdomen. Gastrointestinal tract: Within normal limits. Normal-appearing appendix. Vascular: Calcifications in the aorta. Lala Rued PELVIS:   Extraperitoneum: Within normal limits. Ureters: Within normal limits. Bladder: Ross catheter in a decompressed bladder. There is a loculated gas   within the bladder, likely iatrogenic   Reproductive System: Calcifications in the prostate. .   MSK:    Degenerative change in the spine. Bilateral, L5 pars defects and grade 1   anterior listhesis of L5 on S1. Generative disc disease from L2-S1. Levoscoliosis of the lumbar spine   . Assessment/Plan:     *) Gastric Adenocarcinoma- metastatic, HER2 and PD-L1 negative, MMR intact.   --OSH - Started on [facility] for initially neoadjuvant chemo (could not tolerate docetaxel)- initial intent was curative, but  Disease progression in June 2021, pt delayed initiation of chemotherapy  --c1d1 folfiri - No pegfilgrastim -7/27/21   --Holding further treatment till clinically stable  and per primary oncologist in [facility] oncology practice)  --Reviewed CT CAP -no iv con- no acute findings of obstruction or other.  Hard to interpret adenopathy on a non- iv con scan ( scan from June 2021 - Outside scan- had shown extensive adenopathy outside of the stomach)     *)Neutropenic Sepsis  ---clinically improving  --s/p 1 dose vanco and on q24h Cefepime -  -- Follow  bd cultures and urine culture , cxr- unrevealing  --Cont Cefepime - follow cultures,   --Neutropenic precautions -cont  filgrastim 480 mcg sc daily-(no pegfilgrastim used)   --wbc up to 1.3 today    *)Intractable n/v and d - after folfiri, although similar but milder symptoms started prior to chemotherapy, per sister. ---Likely chemo related, and ct a/p- ruled out gastric outlet obstruction/ DOMINIC ( abdo soft but tender )   --tolerating applesauce today and drinking fluids     *)Severe TOM  ---Per nephrology, ATN( from profound , prolonged n/v and diarrhea)  +/- TLS ( would be very unlikely in gastric adenoca)  ---creat, improved to 1.7 today  ---was given 1 dose rasburicase -8/7/21-per nephrology     *)Pain management  --Was  Followed by palliative care medicine there- on oxycodone 5 mg po q4hr and on MS contin 15 mg po bid, which per his sister, he was trying to take.  Control pain aggressively     Zachery Kelley MD

## 2021-08-09 NOTE — PROGRESS NOTES
Spiritual Care Assessment/Progress Note  [facility]      NAME: Dion Jhaveri      MRN: [id number]  AGE: 48 y.o. SEX: male  Synagogue Affiliation: No Congregation   Language: English     8/9/2021     Total Time (in minutes): 15     Spiritual Assessment begun in [address] through conversation with:         [x]Patient        [] Family    [] Friend(s)        Reason for Consult: Palliative Care, Initial/Spiritual Assessment     Spiritual beliefs: (Please include comment if needed)     [] Identifies with a heather tradition:         [] Supported by a heather community:            [] Claims no spiritual orientation:           [] Seeking spiritual identity:                [] Adheres to an individual form of spirituality:           [x] Not able to assess:                           Identified resources for coping:      [] Prayer                               [] Music                  [] Guided Imagery     [] Family/friends                 [] Pet visits     [] Devotional reading                         [x] Unknown     [] Other                                             Interventions offered during this visit: (See comments for more details)                Plan of Care:     [] Support spiritual and/or cultural needs    [] Support AMD and/or advance care planning process      [] Support grieving process   [] Coordinate Rites and/or Rituals    [] Coordination with community clergy   [] No spiritual needs identified at this time   [] Detailed Plan of Care below (See Comments)  [] Make referral to Music Therapy  [] Make referral to Pet Therapy     [] Make referral to Addiction services  [] Make referral to [facility]  [] Make referral to Spiritual Care Partner  [] No future visits requested        [x] Follow up upon further referrals     Comments:  visit per palliative consult. Pt is unavailable at this time. Please contact [phone] Jamie Lira for further support.      [address], [place], [address] ([id number])

## 2021-08-09 NOTE — PROGRESS NOTES
TRANSFER - IN REPORT:    Verbal report received from Setphanie Nguyen RN (name) on Lena Re  being received from ICU (unit) for routine progression of care      Report consisted of patients Situation, Background, Assessment and   Recommendations(SBAR). Information from the following report(s) SBAR was reviewed with the receiving nurse. Opportunity for questions and clarification was provided. Assessment completed upon patients arrival to unit and care assumed.

## 2021-08-09 NOTE — PROGRESS NOTES
Problem: Breathing Pattern - Ineffective  Goal: *Absence of hypoxia  Outcome: Progressing Towards Goal  Goal: *Use of effective breathing techniques  Outcome: Progressing Towards Goal     Problem: Patient Education: Go to Patient Education Activity  Goal: Patient/Family Education  Outcome: Progressing Towards Goal     Problem: Pressure Injury - Risk of  Goal: *Prevention of pressure injury  Description: Document Jareth Scale and appropriate interventions in the flowsheet. Outcome: Progressing Towards Goal  Note: Pressure Injury Interventions:  Sensory Interventions: Assess changes in LOC, Assess need for specialty bed, Avoid rigorous massage over bony prominences    Moisture Interventions: Absorbent underpads    Activity Interventions: Pressure redistribution bed/mattress(bed type)    Mobility Interventions: HOB 30 degrees or less, Pressure redistribution bed/mattress (bed type)    Nutrition Interventions: Discuss nutritional consult with provider    Friction and Shear Interventions: HOB 30 degrees or less                Problem: Patient Education: Go to Patient Education Activity  Goal: Patient/Family Education  Outcome: Progressing Towards Goal     Problem: Falls - Risk of  Goal: *Absence of Falls  Description: Document Saad Fall Risk and appropriate interventions in the flowsheet.   Outcome: Progressing Towards Goal  Note: Fall Risk Interventions:  Mobility Interventions: Communicate number of staff needed for ambulation/transfer, Bed/chair exit alarm, Assess mobility with egress test    Mentation Interventions: Adequate sleep, hydration, pain control, Bed/chair exit alarm, Door open when patient unattended    Medication Interventions: Evaluate medications/consider consulting pharmacy    Elimination Interventions: Call light in reach, Bed/chair exit alarm    History of Falls Interventions: Bed/chair exit alarm, Door open when patient unattended         Problem: Patient Education: Go to Patient Education Activity  Goal: Patient/Family Education  Outcome: Progressing Towards Goal

## 2021-08-09 NOTE — PROGRESS NOTES
[facility]    ICU TEAM Progress Note    Name: Ai Blair   : 1970   MRN: [id number]   Date: 2021           ICU Assessment     1. Dehydration  2. TOM  3. Gastric cancer    Imagin2021   None today         ICU Comprehensive Plan of Care:     1. Neurological System  No issues  Spontaneous Awakening Trial: N/A  Sedation: N/A  Analgesia: Dilaudid    2. Cardiovascular System  Pressors off >24hr  SBP Goal of: > 90 mmHg  MAP Goal of: > 65 mmHg  Norepinephrine - For above SBP/MAP goals  Transfusion Trigger (Hgb): <7 g/dL  Keep K > 4; Mg > 2     3. Respiratory System  Incentive spirometry  Spontaneous Breathing Trial: N/A  Pulmonary toilet: Incentive Spirometry   SpO2 Goal: > 92%   DVT Prophylaxis: Heparin     4. Renal/GI/Endocrine System  IVFs: [email or web address]  Ulcer Prophylaxis: Pepcid (famotidine)   Bowel Regimen: MiraLax  Feeding:  Yes soft, multiple BMs overnight  Blood Sugar Goal 120-180 - Glycemic Control: Insulin    5. Infectious Disease  neutrapenia  Indwelling Catheter:  Tubes: None  Lines: Central Line  Drains: Ross Catheter  D - De-escalation of Antibiotics: 2/4 bottles with pan sensitive pseudomonas at OSH   Cefepime  Vancomycin  COVID Treatment:  n/a     6. PT/OT:      7. Goals of Care Discussion with family Yes     8. Plan of Care/Code Status: Full Code    9. Discussed Care Plan with Bedside RN    10.  Documentation of Current Medications    Subjective:   Progress Note: 2021      Reason for ICU Admission: TOM secondary to dehydration, hypotension    HPI: refer to HPI    Overnight Events:   2021  No acute overnight events    POD:  * No surgery found *    S/P:       Active Problem List:     Problem List  Date Reviewed: 2013        Codes Class    TOM (acute kidney injury) ([facility] [address] ICD-10-CM: N17.9  ICD-9-CM: 584.9         Tobacco abuse ICD-10-CM: Z72.0  ICD-9-CM: 305.1     Overview Signed 2013  6:52 PM by Elsa Jurado MD     Cessation encouraged 5- Primary localized osteoarthrosis, lower leg ICD-10-CM: M17.10  ICD-9-CM: 715.16               Past Medical History:      has a past medical history of Other ill-defined conditions(799.89) (13), Other ill-defined conditions(799.89), and Other ill-defined conditions(799.89) (13). He also has no past medical history of Difficult intubation, Malignant hyperthermia due to anesthesia, Nausea & vomiting, Pseudocholinesterase deficiency, or Unspecified adverse effect of anesthesia. Past Surgical History:      has a past surgical history that includes hx knee replacement; hx knee arthroscopy (Left, at age 21); hx orthopaedic (Right); and hx orthopaedic (Left, ). Home Medications:     Prior to Admission medications    Not on File       Allergies/Social/Family History:     No Known Allergies   Social History     Tobacco Use    Smoking status: Current Every Day Smoker     Packs/day: 0.50     Years: 20.00     Pack years: 10.00     Types: Cigarettes   Substance Use Topics    Alcohol use: No      No family history on file. Review of Systems:     A comprehensive review of systems was negative.     Objective:   Vital Signs:  Visit Vitals  /76   Pulse (!) 102   Temp 99.7 °F (37.6 °C)   Resp 24   Ht 5' 8\" (1.727 m)   Wt 82.6 kg (182 lb 1.6 oz)   SpO2 96%   BMI 27.69 kg/m²    O2 Flow Rate (L/min): 2 l/min O2 Device: None (Room air) Temp (24hrs), Av.8 °F (37.7 °C), Min:99.4 °F (37.4 °C), Max:100.4 °F (38 °C)           Intake/Output:     Intake/Output Summary (Last 24 hours) at 2021 1125  Last data filed at 2021 1000  Gross per 24 hour   Intake 2300 ml   Output 2195 ml   Net 105 ml       Physical Exam:    Visit Vitals  /76   Pulse (!) 102   Temp 99.7 °F (37.6 °C)   Resp 24   Ht 5' 8\" (1.727 m)   Wt 82.6 kg (182 lb 1.6 oz)   SpO2 96%   BMI 27.69 kg/m²     General appearance: alert, cooperative, no distress, appears stated age  Lungs: clear to auscultation bilaterally  Heart: tachy, no m/r/g  Abdomen: soft, non-tender. Bowel sounds normal. No masses,  no organomegaly  Neurologic: Grossly normal       LABS AND  DATA: Personally reviewed  Recent Labs     08/09/21 0226 08/08/21  0449   WBC 1.3* 0.7*   HGB 8.9* 9.7*   HCT 25.9* 29.6*    164     Recent Labs     08/09/21 0226 08/08/21  1147    144   K 3.1* 3.4*    109*   CO2 30 29   BUN 41* 61*   CREA 1.73* 2.53*   * 104*   CA 8.2* 8.1*   MG 1.3* 1.5*   PHOS 1.8* 3.2     Recent Labs     08/09/21 0226 08/08/21  1147   AP 74 80   TP 5.3* 5.4*   ALB 2.1* 2.1*   GLOB 3.2 3.3     Recent Labs     08/09/21 0226 08/08/21  0449   INR 1.3* 1.5*   PTP 13.5* 15.4*      No results for input(s): PHI, PCO2I, PO2I, FIO2I in the last 72 hours. No results for input(s): CPK, CKMB, TROIQ, BNPP in the last 72 hours. Hemodynamics:   PAP:   CO:     Wedge:   CI:     CVP:    SVR:       PVR:       Ventilator Settings:  Mode Rate Tidal Volume Pressure FiO2 PEEP                    Peak airway pressure:      Minute ventilation:          MEDS: Reviewed    Chest X-Ray:  CXR Results  (Last 48 hours)    None             ECHO:        Multidisciplinary Rounds Completed:  yes    ABCDEF Bundle/Checklist Completed:  Yes    SPECIAL EQUIPMENT  None    DISPOSITION  Stable to transfer to the floor    CRITICAL CARE CONSULTANT NOTE  I had a face to face encounter with the patient, reviewed and interpreted patient data including clinical events, labs, images, vital signs, I/O's, and examined patient. I have discussed the case and the plan and management of the patient's care with the consulting services, the bedside nurses and the respiratory therapist.      NOTE OF PERSONAL INVOLVEMENT IN CARE   This patient has a high probability of imminent, clinically significant deterioration, which requires the highest level of preparedness to intervene urgently.  I participated in the decision-making and personally managed or directed the management of the following life and organ supporting interventions that required my frequent assessment to treat or prevent imminent deterioration. I personally spent 30 minutes of critical care time. This is time spent at this critically ill patient's bedside actively involved in patient care as well as the coordination of care. This does not include any procedural time which has been billed separately.     [id number] Critical Care  8/9/2021

## 2021-08-09 NOTE — PROGRESS NOTES
Pharmacist Note - Vancomycin Dosing  Therapy day #3  Indication: Possible sepsis of unclear etiology.  - Neutropenic, undergoing chemotherapy for gastric adenocarcinoma (diagnosed Jan '21)  Current regimen: 1.25 gm IV x 1 dose (given 8/8 @ 0904)    Recent Labs     08/09/21  0226 08/08/21  1147 08/08/21  0449 08/07/21  1012 08/07/21  0426   WBC 1.3*  --  0.7*  --  0.2*   CREA 1.73* 2.53* 3.67*   < >  --    BUN 41* 61* 72*   < >  --     < > = values in this interval not displayed. A random vancomycin level of 12.9 mcg/mL was obtained ~24 hrs post-dose @ 09:11 this am.    Goal target range Trough 10-15 mcg/mL      Plan: The patient's renal function is steadily improving; continue to dose based on levels for now - a one-time dose of 1250 mg to be given now and will reassess tomorrow. Pharmacy will continue to monitor this patient daily for changes in clinical status and renal function.

## 2021-08-09 NOTE — PROGRESS NOTES
Nephrology Progress Note  Cate Session  Date of Admission : 8/7/2021    CC: Follow up for TOM       Assessment and Plan     TOM:  - suspect 2/2 ATN from profound volume depletion   - Cont IVF  - daily labs  - remove tanvi today     Hypokalemia:  - repletion ordered    Hypovolemia:  - cont IVF    Diarrhea:  - C. Diff pending     Hypotension:  - on levophed  - keep MAP > 65     HFpEF:  - EF 66% on 8/7     Gastric cancer     N/V/D       Interval History:  Seen and examined. neutorpenic now. Good UOP, BP stable, Cr better. Having diarrhea. No cp, sob, n/v reported. No fevers or chills     Current Medications: all current  Medications have been eviewed in EPIC  Review of Systems: Pertinent items are noted in HPI. Objective:  Vitals:    Vitals:    08/09/21 0145 08/09/21 0251 08/09/21 0625 08/09/21 0800   BP:       Pulse: (!) 107   (!) 101   Resp: 18   15   Temp:   99.8 °F (37.7 °C) 99.7 °F (37.6 °C)   SpO2: 97%   98%   Weight:  82.6 kg (182 lb 1.6 oz)     Height:         Intake and Output:  08/09 0701 - 08/09 1900  In: -   Out: 175 [Urine:175]  08/07 1901 - 08/09 0700  In: 4024.9 [I.V.:4024.9]  Out: 4055 [Urine:4055]    Physical Examination:  Pt intubated     No  General: NAD,Conversant   Neck:  Supple, no mass  Resp:  Lungs CTA B/L, no wheezing , normal respiratory effort  CV:  RRR,  no murmur or rub, trace LE edema  GI:  Soft, NT, + Bowel sounds, no hepatosplenomegaly  Neurologic:  Non focal  Psych:             AAO x 3 appropriate affect   Skin:  No Rash  :  Ross in place    []    High complexity decision making was performed  []    Patient is at high-risk of decompensation with multiple organ involvement    Lab Data Personally Reviewed: I have reviewed all the pertinent labs, microbiology data and radiology studies during assessment.     Recent Labs     08/09/21  0226 08/08/21  1147 08/08/21  0449 08/07/21  1012 08/07/21  0426    144 145   < >  --    K 3.1* 3.4* 3.8   < >  --     109* 108   < > --    CO2 30 29 29   < >  --    * 104* 108*   < >  --    BUN 41* 61* 72*   < >  --    CREA 1.73* 2.53* 3.67*   < >  --    CA 8.2* 8.1* 8.7   < >  --    MG 1.3* 1.5* 1.6   < >  --    PHOS 1.8* 3.2 3.9   < >  --    ALB 2.1* 2.1* 2.3*   < >  --    ALT 20 14 16   < >  --    INR 1.3*  --  1.5*  --  1.2*    < > = values in this interval not displayed. Recent Labs     08/09/21  0226 08/08/21  0449 08/07/21  0426   WBC 1.3* 0.7* 0.2*   HGB 8.9* 9.7* 10.2*   HCT 25.9* 29.6* 30.8*    164 187     No results found for: [facility]  Lab Results   Component Value Date/Time    Culture result: NO GROWTH 2 DAYS 08/07/2021 02:39 PM    Culture result: MRSA NOT PRESENT 08/07/2021 02:29 PM    Culture result:  08/07/2021 02:29 PM     Screening of patient nares for MRSA is for surveillance purposes and, if positive, to facilitate isolation considerations in high risk settings. It is not intended for automatic decolonization interventions per se as regimens are not sufficiently effective to warrant routine use. Recent Results (from the past 24 hour(s))   METABOLIC PANEL, COMPREHENSIVE    Collection Time: 08/08/21 11:47 AM   Result Value Ref Range    Sodium 144 136 - 145 mmol/L    Potassium 3.4 (L) 3.5 - 5.1 mmol/L    Chloride 109 (H) 97 - 108 mmol/L    CO2 29 21 - 32 mmol/L    Anion gap 6 5 - 15 mmol/L    Glucose 104 (H) 65 - 100 mg/dL    BUN 61 (H) 6 - 20 MG/DL    Creatinine 2.53 (H) 0.70 - 1.30 MG/DL    BUN/Creatinine ratio 24 (H) 12 - 20      GFR est AA 33 (L) >60 ml/min/1.73m2    GFR est non-AA 27 (L) >60 ml/min/1.73m2    Calcium 8.1 (L) 8.5 - 10.1 MG/DL    Bilirubin, total 0.4 0.2 - 1.0 MG/DL    ALT (SGPT) 14 12 - 78 U/L    AST (SGOT) 10 (L) 15 - 37 U/L    Alk.  phosphatase 80 45 - 117 U/L    Protein, total 5.4 (L) 6.4 - 8.2 g/dL    Albumin 2.1 (L) 3.5 - 5.0 g/dL    Globulin 3.3 2.0 - 4.0 g/dL    A-G Ratio 0.6 (L) 1.1 - 2.2     MAGNESIUM    Collection Time: 08/08/21 11:47 AM   Result Value Ref Range    Magnesium 1.5 (L) 1.6 - 2.4 mg/dL   PHOSPHORUS    Collection Time: 08/08/21 11:47 AM   Result Value Ref Range    Phosphorus 3.2 2.6 - 4.7 MG/DL   URIC ACID    Collection Time: 08/08/21 11:47 AM   Result Value Ref Range    Uric acid 3.4 (L) 3.5 - 7.2 MG/DL   CBC WITH AUTOMATED DIFF    Collection Time: 08/09/21  2:26 AM   Result Value Ref Range    WBC 1.3 (L) 4.1 - 11.1 K/uL    RBC 2.83 (L) 4.10 - 5.70 M/uL    HGB 8.9 (L) 12.1 - 17.0 g/dL    HCT 25.9 (L) 36.6 - 50.3 %    MCV 91.5 80.0 - 99.0 FL    MCH 31.4 26.0 - 34.0 PG    MCHC 34.4 30.0 - 36.5 g/dL    RDW 12.0 11.5 - 14.5 %    PLATELET 135 207 - 357 K/uL    MPV 10.5 8.9 - 12.9 FL    NRBC 0.0 0  WBC    ABSOLUTE NRBC 0.00 0.00 - 0.01 K/uL    NEUTROPHILS 24 (L) 32 - 75 %    BAND NEUTROPHILS 2 0 - 6 %    LYMPHOCYTES 57 (H) 12 - 49 %    MONOCYTES 11 5 - 13 %    EOSINOPHILS 6 0 - 7 %    BASOPHILS 0 0 - 1 %    IMMATURE GRANULOCYTES 0 %    ABS. NEUTROPHILS 0.3 (L) 1.8 - 8.0 K/UL    ABS. LYMPHOCYTES 0.8 0.8 - 3.5 K/UL    ABS. MONOCYTES 0.1 0.0 - 1.0 K/UL    ABS. EOSINOPHILS 0.1 0.0 - 0.4 K/UL    ABS. BASOPHILS 0.0 0.0 - 0.1 K/UL    ABS. IMM.  GRANS. 0.0 K/UL    DF MANUAL      PLATELET COMMENTS Large Platelets      RBC COMMENTS OVALOCYTES  PRESENT       LACTIC ACID    Collection Time: 08/09/21  2:26 AM   Result Value Ref Range    Lactic acid 1.2 0.4 - 2.0 MMOL/L   PROTHROMBIN TIME + INR    Collection Time: 08/09/21  2:26 AM   Result Value Ref Range    INR 1.3 (H) 0.9 - 1.1      Prothrombin time 13.5 (H) 9.0 - 06.1 sec   METABOLIC PANEL, COMPREHENSIVE    Collection Time: 08/09/21  2:26 AM   Result Value Ref Range    Sodium 142 136 - 145 mmol/L    Potassium 3.1 (L) 3.5 - 5.1 mmol/L    Chloride 108 97 - 108 mmol/L    CO2 30 21 - 32 mmol/L    Anion gap 4 (L) 5 - 15 mmol/L    Glucose 107 (H) 65 - 100 mg/dL    BUN 41 (H) 6 - 20 MG/DL    Creatinine 1.73 (H) 0.70 - 1.30 MG/DL    BUN/Creatinine ratio 24 (H) 12 - 20      GFR est AA 51 (L) >60 ml/min/1.73m2    GFR est non-AA 42 (L) >60 ml/min/1.73m2    Calcium 8.2 (L) 8.5 - 10.1 MG/DL    Bilirubin, total 0.4 0.2 - 1.0 MG/DL    ALT (SGPT) 20 12 - 78 U/L    AST (SGOT) 12 (L) 15 - 37 U/L    Alk. phosphatase 74 45 - 117 U/L    Protein, total 5.3 (L) 6.4 - 8.2 g/dL    Albumin 2.1 (L) 3.5 - 5.0 g/dL    Globulin 3.2 2.0 - 4.0 g/dL    A-G Ratio 0.7 (L) 1.1 - 2.2     MAGNESIUM    Collection Time: 08/09/21  2:26 AM   Result Value Ref Range    Magnesium 1.3 (L) 1.6 - 2.4 mg/dL   PHOSPHORUS    Collection Time: 08/09/21  2:26 AM   Result Value Ref Range    Phosphorus 1.8 (L) 2.6 - 4.7 MG/DL   URIC ACID    Collection Time: 08/09/21  2:26 AM   Result Value Ref Range    Uric acid 2.4 (L) 3.5 - 7.2 MG/DL                     Brittani George MD  [address]  Phone - [phone]   Fax - [phone]  [email or web address]

## 2021-08-09 NOTE — PROGRESS NOTES
Reviewed chart for transitions of care, and discussed in rounds. CM met with patient at bedside to explain role and offer support. Patient is alert and oriented x4, and confirmed demographics. Baseline:   ADLs/IDALS: Independent  Previous Home Health:NA  Previous SNF/IPR:NA  ER Contact: Sister : Nathaniel Olea [phone]    Patient lives in a 2 level house with 3 steps to enter. Patient is independent with ADLs/IDALs   Patient has no previous HH or equipment needs. Family to transport at discharge. Reason for Admission:  Acute renal failure                     RUR Score:   13%                  Plan for utilizing home health:     TBD     PCP: First and Last name:  Patient cannot recall PCP name     Name of Practice:    Are you a current patient: Yes/No:    Approximate date of last visit:    Can you participate in a virtual visit with your PCP:                     Current Advanced Directive/Advance Care Plan: Full Code      Healthcare Decision Maker:   Click here to complete [address] including selection of the Healthcare Decision Maker Relationship (ie \"Primary\")           Patient presented to [address] [place] [address] for 4 days of nausea/vomiting. Patient with a history of gastric cancer, was receiving chemo at [facility], last treatment 7/27/21. Patient transferred to [facility] for possible CRRT, Bun 170 Creatine 19.2. Care manager met with patient to introduce self and explain role. Patient lives independently with his sister Nathaniel Olea [phone] who drives him to appointments. Confirmed insurance to be [employer]. Care management will follow for transitions of care.  Katheryn Still RN,Care Management    Care Management Interventions  MyChart Signup: No  Discharge Durable Medical Equipment: No  Physical Therapy Consult: No  Occupational Therapy Consult: No  Speech Therapy Consult: No  Current Support Network:  (Sister Nathaniel Olea [phone])  Confirm Follow Up Transport: Family  Discharge Location  Discharge Placement: Home with family assistance

## 2021-08-10 LAB
ALBUMIN SERPL-MCNC: 2.1 G/DL (ref 3.5–5)
ALBUMIN/GLOB SERPL: 0.6 {RATIO} (ref 1.1–2.2)
ALP SERPL-CCNC: 75 U/L (ref 45–117)
ALT SERPL-CCNC: 23 U/L (ref 12–78)
ANION GAP SERPL CALC-SCNC: 9 MMOL/L (ref 5–15)
AST SERPL-CCNC: 13 U/L (ref 15–37)
BASOPHILS # BLD: 0 K/UL (ref 0–0.1)
BASOPHILS NFR BLD: 0 % (ref 0–1)
BILIRUB SERPL-MCNC: 0.6 MG/DL (ref 0.2–1)
BUN SERPL-MCNC: 21 MG/DL (ref 6–20)
BUN/CREAT SERPL: 17 (ref 12–20)
CALCIUM SERPL-MCNC: 8.1 MG/DL (ref 8.5–10.1)
CHLORIDE SERPL-SCNC: 109 MMOL/L (ref 97–108)
CO2 SERPL-SCNC: 24 MMOL/L (ref 21–32)
CREAT SERPL-MCNC: 1.27 MG/DL (ref 0.7–1.3)
DIFFERENTIAL METHOD BLD: ABNORMAL
EOSINOPHIL # BLD: 0.1 K/UL (ref 0–0.4)
EOSINOPHIL NFR BLD: 2 % (ref 0–7)
ERYTHROCYTE [DISTWIDTH] IN BLOOD BY AUTOMATED COUNT: 12 % (ref 11.5–14.5)
GLOBULIN SER CALC-MCNC: 3.3 G/DL (ref 2–4)
GLUCOSE SERPL-MCNC: 104 MG/DL (ref 65–100)
HCT VFR BLD AUTO: 29 % (ref 36.6–50.3)
HGB BLD-MCNC: 9.6 G/DL (ref 12.1–17)
IMM GRANULOCYTES # BLD AUTO: 0 K/UL
IMM GRANULOCYTES NFR BLD AUTO: 0 %
INR PPP: 1.2 (ref 0.9–1.1)
LACTATE SERPL-SCNC: 1.4 MMOL/L (ref 0.4–2)
LYMPHOCYTES # BLD: 1.1 K/UL (ref 0.8–3.5)
LYMPHOCYTES NFR BLD: 27 % (ref 12–49)
MAGNESIUM SERPL-MCNC: 1.6 MG/DL (ref 1.6–2.4)
MCH RBC QN AUTO: 30.4 PG (ref 26–34)
MCHC RBC AUTO-ENTMCNC: 33.1 G/DL (ref 30–36.5)
MCV RBC AUTO: 91.8 FL (ref 80–99)
MONOCYTES # BLD: 0.4 K/UL (ref 0–1)
MONOCYTES NFR BLD: 9 % (ref 5–13)
NEUTS SEG # BLD: 2.6 K/UL (ref 1.8–8)
NEUTS SEG NFR BLD: 62 % (ref 32–75)
NRBC # BLD: 0 K/UL (ref 0–0.01)
NRBC BLD-RTO: 0 PER 100 WBC
PHOSPHATE SERPL-MCNC: 1.8 MG/DL (ref 2.6–4.7)
PLATELET # BLD AUTO: 198 K/UL (ref 150–400)
PLATELET COMMENTS,PCOM: ABNORMAL
PMV BLD AUTO: 10.2 FL (ref 8.9–12.9)
POTASSIUM SERPL-SCNC: 3 MMOL/L (ref 3.5–5.1)
PROT SERPL-MCNC: 5.4 G/DL (ref 6.4–8.2)
PROTHROMBIN TIME: 12.3 SEC (ref 9–11.1)
RBC # BLD AUTO: 3.16 M/UL (ref 4.1–5.7)
RBC MORPH BLD: ABNORMAL
SODIUM SERPL-SCNC: 142 MMOL/L (ref 136–145)
URATE SERPL-MCNC: 2.5 MG/DL (ref 3.5–7.2)
VANCOMYCIN SERPL-MCNC: 12.7 UG/ML
WBC # BLD AUTO: 4.2 K/UL (ref 4.1–11.1)

## 2021-08-10 PROCEDURE — 74011000258 HC RX REV CODE- 258: Performed by: INTERNAL MEDICINE

## 2021-08-10 PROCEDURE — 85025 COMPLETE CBC W/AUTO DIFF WBC: CPT

## 2021-08-10 PROCEDURE — 74011000250 HC RX REV CODE- 250: Performed by: INTERNAL MEDICINE

## 2021-08-10 PROCEDURE — 83735 ASSAY OF MAGNESIUM: CPT

## 2021-08-10 PROCEDURE — 84100 ASSAY OF PHOSPHORUS: CPT

## 2021-08-10 PROCEDURE — 85610 PROTHROMBIN TIME: CPT

## 2021-08-10 PROCEDURE — 74011000258 HC RX REV CODE- 258: Performed by: HOSPITALIST

## 2021-08-10 PROCEDURE — 36415 COLL VENOUS BLD VENIPUNCTURE: CPT

## 2021-08-10 PROCEDURE — 83605 ASSAY OF LACTIC ACID: CPT

## 2021-08-10 PROCEDURE — 84550 ASSAY OF BLOOD/URIC ACID: CPT

## 2021-08-10 PROCEDURE — 74011250637 HC RX REV CODE- 250/637: Performed by: HEALTH CARE PROVIDER

## 2021-08-10 PROCEDURE — 74011250636 HC RX REV CODE- 250/636: Performed by: INTERNAL MEDICINE

## 2021-08-10 PROCEDURE — 74011250636 HC RX REV CODE- 250/636: Performed by: HOSPITALIST

## 2021-08-10 PROCEDURE — 80202 ASSAY OF VANCOMYCIN: CPT

## 2021-08-10 PROCEDURE — 80053 COMPREHEN METABOLIC PANEL: CPT

## 2021-08-10 PROCEDURE — 74011250636 HC RX REV CODE- 250/636: Performed by: HEALTH CARE PROVIDER

## 2021-08-10 PROCEDURE — 74011250637 HC RX REV CODE- 250/637: Performed by: INTERNAL MEDICINE

## 2021-08-10 PROCEDURE — 65270000029 HC RM PRIVATE

## 2021-08-10 RX ADMIN — ACETAMINOPHEN 650 MG: 325 TABLET ORAL at 20:18

## 2021-08-10 RX ADMIN — SODIUM CHLORIDE, POTASSIUM CHLORIDE, SODIUM LACTATE AND CALCIUM CHLORIDE 100 ML/HR: 600; 310; 30; 20 INJECTION, SOLUTION INTRAVENOUS at 00:09

## 2021-08-10 RX ADMIN — CEFEPIME HYDROCHLORIDE 2 G: 2 INJECTION, POWDER, FOR SOLUTION INTRAVENOUS at 04:36

## 2021-08-10 RX ADMIN — HEPARIN SODIUM 5000 UNITS: 5000 INJECTION INTRAVENOUS; SUBCUTANEOUS at 01:30

## 2021-08-10 RX ADMIN — HEPARIN SODIUM 5000 UNITS: 5000 INJECTION INTRAVENOUS; SUBCUTANEOUS at 09:36

## 2021-08-10 RX ADMIN — CEFEPIME HYDROCHLORIDE 2 G: 2 INJECTION, POWDER, FOR SOLUTION INTRAVENOUS at 20:12

## 2021-08-10 RX ADMIN — TBO-FILGRASTIM 480 MCG: 480 INJECTION, SOLUTION SUBCUTANEOUS at 09:43

## 2021-08-10 RX ADMIN — FAMOTIDINE 20 MG: 10 INJECTION, SOLUTION INTRAVENOUS at 09:35

## 2021-08-10 RX ADMIN — POTASSIUM BICARBONATE 40 MEQ: 782 TABLET, EFFERVESCENT ORAL at 09:36

## 2021-08-10 RX ADMIN — FAMOTIDINE 20 MG: 10 INJECTION, SOLUTION INTRAVENOUS at 20:14

## 2021-08-10 RX ADMIN — POTASSIUM BICARBONATE 40 MEQ: 782 TABLET, EFFERVESCENT ORAL at 17:40

## 2021-08-10 RX ADMIN — CEFEPIME HYDROCHLORIDE 2 G: 2 INJECTION, POWDER, FOR SOLUTION INTRAVENOUS at 13:13

## 2021-08-10 RX ADMIN — Medication 10 ML: at 13:14

## 2021-08-10 RX ADMIN — Medication 10 ML: at 20:22

## 2021-08-10 RX ADMIN — HEPARIN SODIUM 5000 UNITS: 5000 INJECTION INTRAVENOUS; SUBCUTANEOUS at 17:39

## 2021-08-10 NOTE — CONSULTS
Palliative Medicine Consult  Farrukh: [phone]    Patient Name: rGiselda Russell  YOB: 1970    Date of Initial Consult: August 10, 2021  Reason for Consult: Pain Management  Requesting Provider: Arnulfo Vang NP  Primary Care Physician: None     SUMMARY:   Griselda Russell is a 48 y.o. with a past history of ***, who was admitted on 8/7/2021 from *** with a diagnosis of ***. Current medical issues leading to Palliative Medicine involvement include: ***. PALLIATIVE DIAGNOSES:   1. ***       PLAN:   1. ***  2. Initial consult note routed to primary continuity provider and/or primary health care team members  3. Communicated plan of care with: Palliative IDTDino 192 Team     GOALS OF CARE / TREATMENT PREFERENCES:     GOALS OF CARE:       TREATMENT PREFERENCES:   Code Status: Full Code    Advance Care Planning:  [] [facility] Interdisciplinary Team has updated the ACP Navigator with Health Care Decision Maker and Patient Capacity      Primary Decision Maker: Leny Fitch [facility] [phone]  No flowsheet data found. Other Instructions: ***        Other:    As far as possible, the palliative care team has discussed with patient / health care proxy about goals of care / treatment preferences for patient.      HISTORY:     History obtained from:     CHIEF COMPLAINT: ***    HPI/SUBJECTIVE:    The patient is:   [] Verbal and participatory  [] Non-participatory due to:   ***     Clinical Pain Assessment (nonverbal scale for severity on nonverbal patients):              Duration: for how long has pt been experiencing pain (e.g., 2 days, 1 month, years)  Frequency: how often pain is an issue (e.g., several times per day, once every few days, constant)     FUNCTIONAL ASSESSMENT:     Palliative Performance Scale (PPS):          PSYCHOSOCIAL/SPIRITUAL SCREENING:     Palliative IDT has assessed this patient for cultural preferences / practices and a referral made as appropriate to needs (Cultural Services, Patient Advocacy, Ethics, etc.)    Any spiritual / Mormon concerns:  [] Yes /  [x] No    Caregiver Burnout:  [] Yes /  [x] No /  [] No Caregiver Present      Anticipatory grief assessment:   [x] Normal  / [] Maladaptive       ESAS Anxiety:      ESAS Depression:          REVIEW OF SYSTEMS:     Positive and pertinent negative findings in ROS are noted above in HPI. The following systems were [x] reviewed / [] unable to be reviewed as noted in HPI  Other findings are noted below. Systems: constitutional, ears/nose/mouth/throat, respiratory, gastrointestinal, genitourinary, musculoskeletal, integumentary, neurologic, psychiatric, endocrine. Positive findings noted below. Modified ESAS Completed by: provider                                   Stool Occurrence(s): 1        PHYSICAL EXAM:     From RN flowsheet:  Wt Readings from Last 3 Encounters:   08/09/21 182 lb 1.6 oz (82.6 kg)   04/23/16 240 lb (108.9 kg)   12/02/13 240 lb (108.9 kg)     Blood pressure (!) 158/98, pulse 97, temperature 99.3 °F (37.4 °C), resp. rate 18, height 5' 8\" (1.727 m), weight 182 lb 1.6 oz (82.6 kg), SpO2 96 %.     Pain Scale 1: Numeric (0 - 10)  Pain Intensity 1: 0  Pain Onset 1: chronic  Pain Location 1: Abdomen  Pain Orientation 1: Lower  Pain Description 1: Aching  Pain Intervention(s) 1: Encouraged PCA  Last bowel movement, if known:     Constitutional: ***  Eyes: pupils equal, anicteric  ENMT: no nasal discharge, moist mucous membranes  Cardiovascular: regular rhythm, distal pulses intact  Respiratory: breathing not labored, symmetric  Gastrointestinal: soft non-tender, +bowel sounds  Musculoskeletal: no deformity, no tenderness to palpation  Skin: warm, dry  Neurologic: following commands, moving all extremities  Psychiatric: full affect, no hallucinations  Other:       HISTORY:     Active Problems:    TOM (acute kidney injury) ([facility] [address] (8/7/2021)      Past Medical History:   Diagnosis Date    Other ill-defined conditions(799.89) 8-8-13    Hgb 15.1, K 4.1, creat 0.6, EKG: NSR, ? inf  MI    Other ill-defined conditions(799.89)     obesity    Other ill-defined conditions(799.89) 11-22-13    Hgb 14.9, K 3.8, creat 0.8, EKG: NSR      Past Surgical History:   Procedure Laterality Date    HX KNEE ARTHROSCOPY Left at age 21    ACL repair    HX KNEE REPLACEMENT      HX ORTHOPAEDIC Right     hand    HX ORTHOPAEDIC Left 2013    TKR      No family history on file. History reviewed, no pertinent family history. Social History     Tobacco Use    Smoking status: Current Every Day Smoker     Packs/day: 0.50     Years: 20.00     Pack years: 10.00     Types: Cigarettes   Substance Use Topics    Alcohol use: No     No Known Allergies   Current Facility-Administered Medications   Medication Dose Route Frequency    Vanc random level due 8/10 @ 1100 - RN please draw. Thanks!    Other ONCE    cefepime (MAXIPIME) 2 g in 0.9% sodium chloride (MBP/ADV) 100 mL MBP  2 g IntraVENous Q8H    potassium bicarb-citric acid (EFFER-K) tablet 40 mEq  40 mEq Oral BID    famotidine (PF) (PEPCID) 20 mg in 0.9% sodium chloride 10 mL injection  20 mg IntraVENous Q12H    heparin (porcine) injection 5,000 Units  5,000 Units SubCUTAneous Q8H    sodium chloride (NS) flush 5-40 mL  5-40 mL IntraVENous Q8H    sodium chloride (NS) flush 5-40 mL  5-40 mL IntraVENous PRN    acetaminophen (TYLENOL) tablet 650 mg  650 mg Oral Q6H PRN    Or    acetaminophen (TYLENOL) suppository 650 mg  650 mg Rectal Q6H PRN    polyethylene glycol (MIRALAX) packet 17 g  17 g Oral DAILY PRN    ondansetron (ZOFRAN ODT) tablet 4 mg  4 mg Oral Q8H PRN    Or    ondansetron (ZOFRAN) injection 4 mg  4 mg IntraVENous Q6H PRN    Vancomycin - Pharmacy to Dose   Other Rx Dosing/Monitoring    lactated Ringers infusion  100 mL/hr IntraVENous CONTINUOUS    tbo-filgrastim (GRANIX) injection 480 mcg  480 mcg SubCUTAneous DAILY    HYDROmorphone (PF) 25 mg/50 mL (DILAUDID) PCA   IntraVENous CONTINUOUS          LAB AND IMAGING FINDINGS:     Lab Results   Component Value Date/Time    WBC 4.2 08/10/2021 12:13 AM    HGB 9.6 (L) 08/10/2021 12:13 AM    PLATELET 401 22/61/1022 12:13 AM     Lab Results   Component Value Date/Time    Sodium 142 08/10/2021 12:13 AM    Potassium 3.0 (L) 08/10/2021 12:13 AM    Chloride 109 (H) 08/10/2021 12:13 AM    CO2 24 08/10/2021 12:13 AM    BUN 21 (H) 08/10/2021 12:13 AM    Creatinine 1.27 08/10/2021 12:13 AM    Calcium 8.1 (L) 08/10/2021 12:13 AM    Magnesium 1.6 08/10/2021 12:13 AM    Phosphorus 1.8 (L) 08/10/2021 12:13 AM      Lab Results   Component Value Date/Time    Alk. phosphatase 75 08/10/2021 12:13 AM    Protein, total 5.4 (L) 08/10/2021 12:13 AM    Albumin 2.1 (L) 08/10/2021 12:13 AM    Globulin 3.3 08/10/2021 12:13 AM     Lab Results   Component Value Date/Time    INR 1.2 (H) 08/10/2021 12:13 AM    Prothrombin time 12.3 (H) 08/10/2021 12:13 AM      No results found for: IRON, FE, TIBC, IBCT, PSAT, FERR   No results found for: PH, PCO2, PO2  No components found for: GLPOC   No results found for: CPK, CKMB             Total time:   Counseling / coordination time, spent as noted above:   > 50% counseling / coordination?:     Prolonged service was provided for  []30 min   []75 min in face to face time in the presence of the patient, spent as noted above. Time Start:   Time End:   Note: this can only be billed with 67453 (initial) or 83507 (follow up). If multiple start / stop times, list each separately.

## 2021-08-10 NOTE — PROGRESS NOTES
Renal Dosing/Monitoring  Medication: cefepime   Current regimen:  2g IV every 12 hr  Recent Labs     08/10/21  0013 08/09/21  0226 08/08/21  1147   CREA 1.27 1.73* 2.53*   BUN 21* 41* 61*     Estimated CrCl:  73 ml/min  Plan: Change to 2g IV q8h for CrCl >60 ml/min  per renal dosing protocol. Neeru Feliciano, Pharm. D

## 2021-08-10 NOTE — PROGRESS NOTES
Nephrology Progress Note  Osmar Osuna  Date of Admission : 8/7/2021    CC: Follow up for TOM       Assessment and Plan     TOM:  - suspect 2/2 ATN from profound volume depletion   - Cont IVF for now  - daily labs and I/Os     Hypokalemia/phos/mag:  - IV repletion ordered    Hypovolemia:  - cont IVF    Neutropenia:  - per heme    Hypotension:  - resolved, off pressors     HFpEF:  - EF 66% on 8/7     Gastric cancer     N/V/D       Interval History:  Seen and examined. Transferred out of ICU. Resting in bed. Cr better. Stable UOP. No cp or sob reported. Current Medications: all current  Medications have been eviewed in EPIC  Review of Systems: Pertinent items are noted in HPI. Objective:  Vitals:    Vitals:    08/09/21 1755 08/09/21 1935 08/10/21 0007 08/10/21 0906   BP: (!) 142/90  (!) 140/86 (!) 158/98   Pulse: (!) 113 87 (!) 106 97   Resp: 20 19 18   Temp: 98.4 °F (36.9 °C)  98.9 °F (37.2 °C) 99.3 °F (37.4 °C)   SpO2: 98%  93% 96%   Weight:       Height:         Intake and Output:  08/10 0701 - 08/10 1900  In: 280 [P.O.:280]  Out: -   08/08 1901 - 08/10 0700  In: 2477.1 [I.V.:2477.1]  Out: 2170 [Urine:2170]    Physical Examination:    General: NAD,Conversant   Neck:  Supple, no mass  Resp:  Lungs CTA B/L, no wheezing , normal respiratory effort  CV:  RRR,  no murmur or rub, trace LE edema  GI:  Soft, NT, + Bowel sounds, no hepatosplenomegaly  Neurologic:  Non focal  Psych:             AAO x 3 appropriate affect   Skin:  No Rash    []    High complexity decision making was performed  []    Patient is at high-risk of decompensation with multiple organ involvement    Lab Data Personally Reviewed: I have reviewed all the pertinent labs, microbiology data and radiology studies during assessment.     Recent Labs     08/10/21  0013 08/09/21  0226 08/08/21  1147 08/08/21  0449 08/08/21  0449    142 144   < > 145   K 3.0* 3.1* 3.4*   < > 3.8   * 108 109*   < > 108   CO2 24 30 29   < > 29   * 107* 104*   < > 108*   BUN 21* 41* 61*   < > 72*   CREA 1.27 1.73* 2.53*   < > 3.67*   CA 8.1* 8.2* 8.1*   < > 8.7   MG 1.6 1.3* 1.5*   < > 1.6   PHOS 1.8* 1.8* 3.2   < > 3.9   ALB 2.1* 2.1* 2.1*   < > 2.3*   ALT 23 20 14   < > 16   INR 1.2* 1.3*  --   --  1.5*    < > = values in this interval not displayed. Recent Labs     08/10/21  0013 08/09/21  0226 08/08/21  0449   WBC 4.2 1.3* 0.7*   HGB 9.6* 8.9* 9.7*   HCT 29.0* 25.9* 29.6*    160 164     No results found for: [facility] [place]  Lab Results   Component Value Date/Time    Culture result: NO GROWTH 3 DAYS 08/07/2021 02:39 PM    Culture result: MRSA NOT PRESENT 08/07/2021 02:29 PM    Culture result:  08/07/2021 02:29 PM     Screening of patient nares for MRSA is for surveillance purposes and, if positive, to facilitate isolation considerations in high risk settings. It is not intended for automatic decolonization interventions per se as regimens are not sufficiently effective to warrant routine use. Recent Results (from the past 24 hour(s))   CBC WITH AUTOMATED DIFF    Collection Time: 08/10/21 12:13 AM   Result Value Ref Range    WBC 4.2 4.1 - 11.1 K/uL    RBC 3.16 (L) 4.10 - 5.70 M/uL    HGB 9.6 (L) 12.1 - 17.0 g/dL    HCT 29.0 (L) 36.6 - 50.3 %    MCV 91.8 80.0 - 99.0 FL    MCH 30.4 26.0 - 34.0 PG    MCHC 33.1 30.0 - 36.5 g/dL    RDW 12.0 11.5 - 14.5 %    PLATELET 691 774 - 441 K/uL    MPV 10.2 8.9 - 12.9 FL    NRBC 0.0 0  WBC    ABSOLUTE NRBC 0.00 0.00 - 0.01 K/uL    NEUTROPHILS 62 32 - 75 %    LYMPHOCYTES 27 12 - 49 %    MONOCYTES 9 5 - 13 %    EOSINOPHILS 2 0 - 7 %    BASOPHILS 0 0 - 1 %    IMMATURE GRANULOCYTES 0 %    ABS. NEUTROPHILS 2.6 1.8 - 8.0 K/UL    ABS. LYMPHOCYTES 1.1 0.8 - 3.5 K/UL    ABS. MONOCYTES 0.4 0.0 - 1.0 K/UL    ABS. EOSINOPHILS 0.1 0.0 - 0.4 K/UL    ABS. BASOPHILS 0.0 0.0 - 0.1 K/UL    ABS. IMM.  GRANS. 0.0 K/UL    DF MANUAL      PLATELET COMMENTS Large Platelets      RBC COMMENTS NORMOCYTIC, NORMOCHROMIC     LACTIC ACID Collection Time: 08/10/21 12:13 AM   Result Value Ref Range    Lactic acid 1.4 0.4 - 2.0 MMOL/L   PROTHROMBIN TIME + INR    Collection Time: 08/10/21 12:13 AM   Result Value Ref Range    INR 1.2 (H) 0.9 - 1.1      Prothrombin time 12.3 (H) 9.0 - 84.1 sec   METABOLIC PANEL, COMPREHENSIVE    Collection Time: 08/10/21 12:13 AM   Result Value Ref Range    Sodium 142 136 - 145 mmol/L    Potassium 3.0 (L) 3.5 - 5.1 mmol/L    Chloride 109 (H) 97 - 108 mmol/L    CO2 24 21 - 32 mmol/L    Anion gap 9 5 - 15 mmol/L    Glucose 104 (H) 65 - 100 mg/dL    BUN 21 (H) 6 - 20 MG/DL    Creatinine 1.27 0.70 - 1.30 MG/DL    BUN/Creatinine ratio 17 12 - 20      GFR est AA >60 >60 ml/min/1.73m2    GFR est non-AA >60 >60 ml/min/1.73m2    Calcium 8.1 (L) 8.5 - 10.1 MG/DL    Bilirubin, total 0.6 0.2 - 1.0 MG/DL    ALT (SGPT) 23 12 - 78 U/L    AST (SGOT) 13 (L) 15 - 37 U/L    Alk. phosphatase 75 45 - 117 U/L    Protein, total 5.4 (L) 6.4 - 8.2 g/dL    Albumin 2.1 (L) 3.5 - 5.0 g/dL    Globulin 3.3 2.0 - 4.0 g/dL    A-G Ratio 0.6 (L) 1.1 - 2.2     MAGNESIUM    Collection Time: 08/10/21 12:13 AM   Result Value Ref Range    Magnesium 1.6 1.6 - 2.4 mg/dL   PHOSPHORUS    Collection Time: 08/10/21 12:13 AM   Result Value Ref Range    Phosphorus 1.8 (L) 2.6 - 4.7 MG/DL   URIC ACID    Collection Time: 08/10/21 12:13 AM   Result Value Ref Range    Uric acid 2.5 (L) 3.5 - 7.2 MG/DL                     Grayson Hutson MD  [address]  Phone - [phone]   Fax - [phone]  [email or web address]

## 2021-08-10 NOTE — PROGRESS NOTES
[facility]  Dani Mckeon MD      Progress Note        PATIENT ID: Felipe Orosco  MRN: [id number]   YOB: 1970    PRIMARY CARE PROVIDER: None   DATE OF ADMISSION: 8/7/2021  2:55 AM        Brief admission history    HPI: 50M w/ hx of gastric adenocarcinoma diagnosed in January 2021, being seen at [facility] oncology under going chemotherapy, last dose of CHEMO FOLFIRI on 7/27/2021, also notable for abdominal lymphadenopathy, HLD, GERD, chronic pain on home opioid medication (oxycodone 10-15 q4h MS contin 15mg BID) presented to [address] [place] [address] for 4 days of nausea/vomiting, patient was noted to have Cr of 19.2, , K 6.6, patient received 6L of NS then started on D51/4 saline. Patient was also given Veltassa for hyperkalemia. On arrival to [facility] ICU patient is alert and awake, tachycardic in the 140's with norepinephrine at 16 mcg/min. Nephrology consulted to initiate CRRT. Pt c/o nausea/abdominal tenderness. Transferred out of ICU 8/9    Subjective  --Feeling well,no complaints. --Asked if he can go home tomorrow     Assessment and Plan:    Profound TOM ,predominantly prerenal 2/2 profound hypovolemia due to GI loss  - Improving creatinine 1.27(11.90 on admission)   - On dose of rasburicase given. - Continue IV fluids   - Nephrology following    Intractable n/v/d,improved  - Continue IV fluids   - Continue antiemetics  - C- diff negative      Hypokalemia   - replace and monitor     Hypomagnesemia  - Likely secondary to volume depletion   - Replace   - Monitor labs and replace as needed     Hypophosphatemia   - Replace   - Monitor labs and replace as needed     Neutropenic sepsis. Neutropenia resolved- 0.7-> 1.3->4. 2   - BC from OSH grew pseudomonas( I spoke with charge nurse at [address] [place] [address],will be faxing papers to me)  -UA negative. CT chest ,abdomen and pelvis,no CT explanation for the patient's sepsis. -Blood cx from 8/7 here,NGSF. MRSA swab negative  -D/c vancomycin   -Will consult ID           Gastric adenocarcinoma- mets   - Oncology following   - Pain control with PCA hydromorphone   - Consult palliative- to assist with pain control         Code status: Full  VTE px: SCD             PHYSICAL EXAMINATION:  Visit Vitals  BP (!) 140/86 (BP 1 Location: Left upper arm, BP Patient Position: At rest)   Pulse (!) 106   Temp 98.9 °F (37.2 °C)   Resp 19   Ht 5' 8\" (1.727 m)   Wt 82.6 kg (182 lb 1.6 oz)   SpO2 93%   BMI 27.69 kg/m²       General:          Alert, cooperative, no distress  HEENT:           Atraumatic, MMM            Neck:               Supple, symmetrical,  thyroid: non tender  Lungs:             Clear to auscultation bilaterally. No Wheezing or Rhonchi. No rales. Heart:              Regular  rhythm,  No  murmur   No edema  Abdomen:       Soft, non-tender. Not distended. Bowel sounds normal  Extremities:     No cyanosis. No clubbing,  +2 distal pulses  Skin:                Not pale. Not Jaundiced  No rashes   Psych:             Not anxious or agitated. Neurologic:      Alert, moves all extremities, oriented X 3.      CMP:   Lab Results   Component Value Date/Time     08/10/2021 12:13 AM    K 3.0 (L) 08/10/2021 12:13 AM     (H) 08/10/2021 12:13 AM    CO2 24 08/10/2021 12:13 AM    AGAP 9 08/10/2021 12:13 AM     (H) 08/10/2021 12:13 AM    BUN 21 (H) 08/10/2021 12:13 AM    CREA 1.27 08/10/2021 12:13 AM    GFRAA >60 08/10/2021 12:13 AM    GFRNA >60 08/10/2021 12:13 AM    CA 8.1 (L) 08/10/2021 12:13 AM    MG 1.6 08/10/2021 12:13 AM    PHOS 1.8 (L) 08/10/2021 12:13 AM    ALB 2.1 (L) 08/10/2021 12:13 AM    TP 5.4 (L) 08/10/2021 12:13 AM    GLOB 3.3 08/10/2021 12:13 AM    AGRAT 0.6 (L) 08/10/2021 12:13 AM    ALT 23 08/10/2021 12:13 AM     Recent Results (from the past 12 hour(s))   CBC WITH AUTOMATED DIFF    Collection Time: 08/10/21 12:13 AM   Result Value Ref Range    WBC 4.2 4.1 - 11.1 K/uL    RBC 3.16 (L) 4.10 - 5.70 M/uL HGB 9.6 (L) 12.1 - 17.0 g/dL    HCT 29.0 (L) 36.6 - 50.3 %    MCV 91.8 80.0 - 99.0 FL    MCH 30.4 26.0 - 34.0 PG    MCHC 33.1 30.0 - 36.5 g/dL    RDW 12.0 11.5 - 14.5 %    PLATELET 340 393 - 510 K/uL    MPV 10.2 8.9 - 12.9 FL    NRBC 0.0 0  WBC    ABSOLUTE NRBC 0.00 0.00 - 0.01 K/uL    NEUTROPHILS 62 32 - 75 %    LYMPHOCYTES 27 12 - 49 %    MONOCYTES 9 5 - 13 %    EOSINOPHILS 2 0 - 7 %    BASOPHILS 0 0 - 1 %    IMMATURE GRANULOCYTES 0 %    ABS. NEUTROPHILS 2.6 1.8 - 8.0 K/UL    ABS. LYMPHOCYTES 1.1 0.8 - 3.5 K/UL    ABS. MONOCYTES 0.4 0.0 - 1.0 K/UL    ABS. EOSINOPHILS 0.1 0.0 - 0.4 K/UL    ABS. BASOPHILS 0.0 0.0 - 0.1 K/UL    ABS. IMM. GRANS. 0.0 K/UL    DF MANUAL      PLATELET COMMENTS Large Platelets      RBC COMMENTS NORMOCYTIC, NORMOCHROMIC     LACTIC ACID    Collection Time: 08/10/21 12:13 AM   Result Value Ref Range    Lactic acid 1.4 0.4 - 2.0 MMOL/L   PROTHROMBIN TIME + INR    Collection Time: 08/10/21 12:13 AM   Result Value Ref Range    INR 1.2 (H) 0.9 - 1.1      Prothrombin time 12.3 (H) 9.0 - 59.6 sec   METABOLIC PANEL, COMPREHENSIVE    Collection Time: 08/10/21 12:13 AM   Result Value Ref Range    Sodium 142 136 - 145 mmol/L    Potassium 3.0 (L) 3.5 - 5.1 mmol/L    Chloride 109 (H) 97 - 108 mmol/L    CO2 24 21 - 32 mmol/L    Anion gap 9 5 - 15 mmol/L    Glucose 104 (H) 65 - 100 mg/dL    BUN 21 (H) 6 - 20 MG/DL    Creatinine 1.27 0.70 - 1.30 MG/DL    BUN/Creatinine ratio 17 12 - 20      GFR est AA >60 >60 ml/min/1.73m2    GFR est non-AA >60 >60 ml/min/1.73m2    Calcium 8.1 (L) 8.5 - 10.1 MG/DL    Bilirubin, total 0.6 0.2 - 1.0 MG/DL    ALT (SGPT) 23 12 - 78 U/L    AST (SGOT) 13 (L) 15 - 37 U/L    Alk.  phosphatase 75 45 - 117 U/L    Protein, total 5.4 (L) 6.4 - 8.2 g/dL    Albumin 2.1 (L) 3.5 - 5.0 g/dL    Globulin 3.3 2.0 - 4.0 g/dL    A-G Ratio 0.6 (L) 1.1 - 2.2     MAGNESIUM    Collection Time: 08/10/21 12:13 AM   Result Value Ref Range    Magnesium 1.6 1.6 - 2.4 mg/dL   PHOSPHORUS Collection Time: 08/10/21 12:13 AM   Result Value Ref Range    Phosphorus 1.8 (L) 2.6 - 4.7 MG/DL   URIC ACID    Collection Time: 08/10/21 12:13 AM   Result Value Ref Range    Uric acid 2.5 (L) 3.5 - 7.2 MG/DL     Past Medical History:   Diagnosis Date    Other ill-defined conditions(799.89) 8-8-13    Hgb 15.1, K 4.1, creat 0.6, EKG: NSR, ? inf  MI    Other ill-defined conditions(799.89)     obesity    Other ill-defined conditions(799.89) 11-22-13    Hgb 14.9, K 3.8, creat 0.8, EKG: NSR     None     None   Lab reviewed  Imaging reviewed  Orders reviewed  Consultants note and other IDR notes reviewed. Signed:    Ines Bernard MD  Date of Service:  8/10/2021

## 2021-08-10 NOTE — PROGRESS NOTES
[facility] Hematology-Oncology Progress Note      Augusta Dyson  1970  [id number]      8/10/2021  2:50 PM    Follow-up for:      [x] Chart notes since last visit reviewed     [x] Medications reviewed for allergies and interactions    Patient complains of the following:   Feels better, no emesis, pain is better,    Subjective:           Objective:     Patient Vitals for the past 24 hrs:   BP Temp Pulse Resp SpO2   08/10/21 0906 (!) 158/98 99.3 °F (37.4 °C) 97 18 96 %   08/10/21 0007 (!) 140/86 98.9 °F (37.2 °C) (!) 106 19 93 %   08/09/21 1935 -- -- 87 -- --   08/09/21 1755 (!) 142/90 98.4 °F (36.9 °C) (!) 113 20 98 %   08/09/21 1600 (!) 153/95 99.4 °F (37.4 °C) 99 23 96 %   08/09/21 1500 (!) 126/104 -- 83 20 98 %   08/09/21 1400 (!) 147/91 -- 94 25 97 %   08/09/21 1329 (!) 149/102 -- 88 11 98 %   08/09/21 1300 -- -- 94 15 98 %   08/09/21 1200 136/74 99.6 °F (37.6 °C) 84 19 95 %   08/09/21 1100 126/76 -- (!) 102 24 96 %       Physical Exam:  MS-Alert, or X 3    CVS-S1,S2 normal  Chest wall- Port site -looks fine  RS-CTA b/l  Abdomen- today, soft, non tender  Extre- No  edema   Neuro- No FND. Available labs reviewed:  Labs:    Recent Results (from the past 24 hour(s))   CBC WITH AUTOMATED DIFF    Collection Time: 08/10/21 12:13 AM   Result Value Ref Range    WBC 4.2 4.1 - 11.1 K/uL    RBC 3.16 (L) 4.10 - 5.70 M/uL    HGB 9.6 (L) 12.1 - 17.0 g/dL    HCT 29.0 (L) 36.6 - 50.3 %    MCV 91.8 80.0 - 99.0 FL    MCH 30.4 26.0 - 34.0 PG    MCHC 33.1 30.0 - 36.5 g/dL    RDW 12.0 11.5 - 14.5 %    PLATELET 135 657 - 246 K/uL    MPV 10.2 8.9 - 12.9 FL    NRBC 0.0 0  WBC    ABSOLUTE NRBC 0.00 0.00 - 0.01 K/uL    NEUTROPHILS 62 32 - 75 %    LYMPHOCYTES 27 12 - 49 %    MONOCYTES 9 5 - 13 %    EOSINOPHILS 2 0 - 7 %    BASOPHILS 0 0 - 1 %    IMMATURE GRANULOCYTES 0 %    ABS. NEUTROPHILS 2.6 1.8 - 8.0 K/UL    ABS. LYMPHOCYTES 1.1 0.8 - 3.5 K/UL    ABS. MONOCYTES 0.4 0.0 - 1.0 K/UL    ABS. EOSINOPHILS 0.1 0.0 - 0.4 K/UL    ABS. BASOPHILS 0.0 0.0 - 0.1 K/UL    ABS. IMM. GRANS. 0.0 K/UL    DF MANUAL      PLATELET COMMENTS Large Platelets      RBC COMMENTS NORMOCYTIC, NORMOCHROMIC     LACTIC ACID    Collection Time: 08/10/21 12:13 AM   Result Value Ref Range    Lactic acid 1.4 0.4 - 2.0 MMOL/L   PROTHROMBIN TIME + INR    Collection Time: 08/10/21 12:13 AM   Result Value Ref Range    INR 1.2 (H) 0.9 - 1.1      Prothrombin time 12.3 (H) 9.0 - 14.0 sec   METABOLIC PANEL, COMPREHENSIVE    Collection Time: 08/10/21 12:13 AM   Result Value Ref Range    Sodium 142 136 - 145 mmol/L    Potassium 3.0 (L) 3.5 - 5.1 mmol/L    Chloride 109 (H) 97 - 108 mmol/L    CO2 24 21 - 32 mmol/L    Anion gap 9 5 - 15 mmol/L    Glucose 104 (H) 65 - 100 mg/dL    BUN 21 (H) 6 - 20 MG/DL    Creatinine 1.27 0.70 - 1.30 MG/DL    BUN/Creatinine ratio 17 12 - 20      GFR est AA >60 >60 ml/min/1.73m2    GFR est non-AA >60 >60 ml/min/1.73m2    Calcium 8.1 (L) 8.5 - 10.1 MG/DL    Bilirubin, total 0.6 0.2 - 1.0 MG/DL    ALT (SGPT) 23 12 - 78 U/L    AST (SGOT) 13 (L) 15 - 37 U/L    Alk.  phosphatase 75 45 - 117 U/L    Protein, total 5.4 (L) 6.4 - 8.2 g/dL    Albumin 2.1 (L) 3.5 - 5.0 g/dL    Globulin 3.3 2.0 - 4.0 g/dL    A-G Ratio 0.6 (L) 1.1 - 2.2     MAGNESIUM    Collection Time: 08/10/21 12:13 AM   Result Value Ref Range    Magnesium 1.6 1.6 - 2.4 mg/dL   PHOSPHORUS    Collection Time: 08/10/21 12:13 AM   Result Value Ref Range    Phosphorus 1.8 (L) 2.6 - 4.7 MG/DL   URIC ACID    Collection Time: 08/10/21 12:13 AM   Result Value Ref Range    Uric acid 2.5 (L) 3.5 - 7.2 MG/DL       Imaging:  CXR Results  (Last 48 hours)    None        CT Results  (Last 48 hours)    None            Assessment/Plan:     *) Gastric Adenocarcinoma- metastatic, HER2 and PD-L1 negative, MMR intact.   --OSH - Started on [facility] for initially neoadjuvant chemo (could not tolerate docetaxel)- initial intent was curative, but  Disease progression in June 2021, pt delayed initiation of chemotherapy  --c1d1 folfiri - No pegfilgrastim -7/27/21   --Holding further treatment till clinically stable  and per primary oncologist in [facility] oncology practice)  --Reviewed CT CAP -no iv con- no acute findings of obstruction or other.  Hard to interpret adenopathy on a non- iv con scan ( scan from June 2021 - Outside scan- had shown extensive adenopathy outside of the stomach)     *)Neutropenic Sepsis  ---clinically improving, still with low grade fever  --s/p 1 dose vanco and on q24h Cefepime -  -- cultures and urine culture are negative so far, cxr- unrevealing  --Cont Cefepime - follow cultures,   --Neutropenic precautions  D/c filgrastim 480 mcg   --wbc up to 4.2   today    *)Intractable n/v and d - after folfiri, although similar but milder symptoms started prior to chemotherapy, symptoms have resolved      *)Severe TOM  ---Per nephrology, ATN( from profound , prolonged n/v and diarrhea)  +/- TLS ( would be very unlikely in gastric adenoca)  ---creat, improved to 1.27 today  ---was given 1 dose rasburicase -8/7/21-per nephrology     *)Pain management  --Was  Followed by palliative care medicine there- on oxycodone 5 mg po q4hr and on MS contin 15 mg po bid, comfortable today    Bertha Ivory MD

## 2021-08-11 ENCOUNTER — APPOINTMENT (OUTPATIENT)
Dept: INTERVENTIONAL RADIOLOGY/VASCULAR | Age: 51
DRG: 720 | End: 2021-08-11
Attending: INTERNAL MEDICINE
Payer: COMMERCIAL

## 2021-08-11 LAB
ALBUMIN SERPL-MCNC: 2.4 G/DL (ref 3.5–5)
ALBUMIN/GLOB SERPL: 0.7 {RATIO} (ref 1.1–2.2)
ALP SERPL-CCNC: 110 U/L (ref 45–117)
ALT SERPL-CCNC: 26 U/L (ref 12–78)
ANION GAP SERPL CALC-SCNC: 7 MMOL/L (ref 5–15)
AST SERPL-CCNC: 18 U/L (ref 15–37)
BASOPHILS # BLD: 0 K/UL (ref 0–0.1)
BASOPHILS NFR BLD: 0 % (ref 0–1)
BILIRUB SERPL-MCNC: 0.6 MG/DL (ref 0.2–1)
BUN SERPL-MCNC: 12 MG/DL (ref 6–20)
BUN/CREAT SERPL: 11 (ref 12–20)
CALCIUM SERPL-MCNC: 8 MG/DL (ref 8.5–10.1)
CHLORIDE SERPL-SCNC: 107 MMOL/L (ref 97–108)
CO2 SERPL-SCNC: 25 MMOL/L (ref 21–32)
CREAT SERPL-MCNC: 1.1 MG/DL (ref 0.7–1.3)
DIFFERENTIAL METHOD BLD: ABNORMAL
EOSINOPHIL # BLD: 0.4 K/UL (ref 0–0.4)
EOSINOPHIL NFR BLD: 3 % (ref 0–7)
ERYTHROCYTE [DISTWIDTH] IN BLOOD BY AUTOMATED COUNT: 12 % (ref 11.5–14.5)
GLOBULIN SER CALC-MCNC: 3.4 G/DL (ref 2–4)
GLUCOSE SERPL-MCNC: 90 MG/DL (ref 65–100)
HCT VFR BLD AUTO: 30.6 % (ref 36.6–50.3)
HGB BLD-MCNC: 10.1 G/DL (ref 12.1–17)
IMM GRANULOCYTES # BLD AUTO: 0 K/UL
IMM GRANULOCYTES NFR BLD AUTO: 0 %
INR PPP: 1.2 (ref 0.9–1.1)
LACTATE SERPL-SCNC: 1.1 MMOL/L (ref 0.4–2)
LYMPHOCYTES # BLD: 1.1 K/UL (ref 0.8–3.5)
LYMPHOCYTES NFR BLD: 8 % (ref 12–49)
MAGNESIUM SERPL-MCNC: 1.4 MG/DL (ref 1.6–2.4)
MCH RBC QN AUTO: 30.4 PG (ref 26–34)
MCHC RBC AUTO-ENTMCNC: 33 G/DL (ref 30–36.5)
MCV RBC AUTO: 92.2 FL (ref 80–99)
METAMYELOCYTES NFR BLD MANUAL: 1 %
MONOCYTES # BLD: 1 K/UL (ref 0–1)
MONOCYTES NFR BLD: 7 % (ref 5–13)
NEUTS BAND NFR BLD MANUAL: 5 % (ref 0–6)
NEUTS SEG # BLD: 11.3 K/UL (ref 1.8–8)
NEUTS SEG NFR BLD: 76 % (ref 32–75)
NRBC # BLD: 0 K/UL (ref 0–0.01)
NRBC BLD-RTO: 0 PER 100 WBC
PHOSPHATE SERPL-MCNC: 1.5 MG/DL (ref 2.6–4.7)
PLATELET # BLD AUTO: 236 K/UL (ref 150–400)
PMV BLD AUTO: 10.3 FL (ref 8.9–12.9)
POTASSIUM SERPL-SCNC: 3 MMOL/L (ref 3.5–5.1)
PROT SERPL-MCNC: 5.8 G/DL (ref 6.4–8.2)
PROTHROMBIN TIME: 12.6 SEC (ref 9–11.1)
RBC # BLD AUTO: 3.32 M/UL (ref 4.1–5.7)
RBC MORPH BLD: ABNORMAL
SODIUM SERPL-SCNC: 139 MMOL/L (ref 136–145)
URATE SERPL-MCNC: 3.6 MG/DL (ref 3.5–7.2)
WBC # BLD AUTO: 14 K/UL (ref 4.1–11.1)
WBC MORPH BLD: ABNORMAL

## 2021-08-11 PROCEDURE — 74011250636 HC RX REV CODE- 250/636: Performed by: INTERNAL MEDICINE

## 2021-08-11 PROCEDURE — 77030010507 HC ADH SKN DERMBND J&J -B

## 2021-08-11 PROCEDURE — 83605 ASSAY OF LACTIC ACID: CPT

## 2021-08-11 PROCEDURE — 85025 COMPLETE CBC W/AUTO DIFF WBC: CPT

## 2021-08-11 PROCEDURE — 65270000029 HC RM PRIVATE

## 2021-08-11 PROCEDURE — 74011250637 HC RX REV CODE- 250/637: Performed by: NURSE PRACTITIONER

## 2021-08-11 PROCEDURE — 74011250637 HC RX REV CODE- 250/637: Performed by: INTERNAL MEDICINE

## 2021-08-11 PROCEDURE — 84100 ASSAY OF PHOSPHORUS: CPT

## 2021-08-11 PROCEDURE — 74011000258 HC RX REV CODE- 258: Performed by: HOSPITALIST

## 2021-08-11 PROCEDURE — 85610 PROTHROMBIN TIME: CPT

## 2021-08-11 PROCEDURE — 36415 COLL VENOUS BLD VENIPUNCTURE: CPT

## 2021-08-11 PROCEDURE — 83735 ASSAY OF MAGNESIUM: CPT

## 2021-08-11 PROCEDURE — 74011250636 HC RX REV CODE- 250/636: Performed by: STUDENT IN AN ORGANIZED HEALTH CARE EDUCATION/TRAINING PROGRAM

## 2021-08-11 PROCEDURE — 74011250637 HC RX REV CODE- 250/637: Performed by: HEALTH CARE PROVIDER

## 2021-08-11 PROCEDURE — 74011000250 HC RX REV CODE- 250

## 2021-08-11 PROCEDURE — 99152 MOD SED SAME PHYS/QHP 5/>YRS: CPT

## 2021-08-11 PROCEDURE — 2709999900 HC NON-CHARGEABLE SUPPLY

## 2021-08-11 PROCEDURE — 74011250636 HC RX REV CODE- 250/636: Performed by: HOSPITALIST

## 2021-08-11 PROCEDURE — 84550 ASSAY OF BLOOD/URIC ACID: CPT

## 2021-08-11 PROCEDURE — 77030031139 HC SUT VCRL2 J&J -A

## 2021-08-11 PROCEDURE — 74011250636 HC RX REV CODE- 250/636: Performed by: HEALTH CARE PROVIDER

## 2021-08-11 PROCEDURE — 80053 COMPREHEN METABOLIC PANEL: CPT

## 2021-08-11 PROCEDURE — 97161 PT EVAL LOW COMPLEX 20 MIN: CPT

## 2021-08-11 PROCEDURE — 74011000250 HC RX REV CODE- 250: Performed by: INTERNAL MEDICINE

## 2021-08-11 PROCEDURE — 77030011893 HC TY CUT DN TRIS -B

## 2021-08-11 RX ORDER — ONDANSETRON 4 MG/1
8 TABLET, ORALLY DISINTEGRATING ORAL
Status: DISCONTINUED | OUTPATIENT
Start: 2021-08-11 | End: 2021-08-13 | Stop reason: HOSPADM

## 2021-08-11 RX ORDER — OXYCODONE HYDROCHLORIDE 5 MG/1
5 TABLET ORAL
Status: DISCONTINUED | OUTPATIENT
Start: 2021-08-11 | End: 2021-08-12

## 2021-08-11 RX ORDER — SODIUM CHLORIDE 9 MG/ML
50 INJECTION, SOLUTION INTRAVENOUS CONTINUOUS
Status: DISCONTINUED | OUTPATIENT
Start: 2021-08-11 | End: 2021-08-11 | Stop reason: HOSPADM

## 2021-08-11 RX ORDER — LIDOCAINE HYDROCHLORIDE 20 MG/ML
INJECTION, SOLUTION INFILTRATION; PERINEURAL
Status: COMPLETED
Start: 2021-08-11 | End: 2021-08-11

## 2021-08-11 RX ORDER — ONDANSETRON 2 MG/ML
8 INJECTION INTRAMUSCULAR; INTRAVENOUS
Status: DISCONTINUED | OUTPATIENT
Start: 2021-08-11 | End: 2021-08-13 | Stop reason: HOSPADM

## 2021-08-11 RX ORDER — FENTANYL CITRATE 50 UG/ML
200 INJECTION, SOLUTION INTRAMUSCULAR; INTRAVENOUS
Status: DISCONTINUED | OUTPATIENT
Start: 2021-08-11 | End: 2021-08-11

## 2021-08-11 RX ORDER — MORPHINE SULFATE 15 MG/1
15 TABLET, FILM COATED, EXTENDED RELEASE ORAL ONCE
Status: COMPLETED | OUTPATIENT
Start: 2021-08-11 | End: 2021-08-11

## 2021-08-11 RX ORDER — LIDOCAINE HYDROCHLORIDE AND EPINEPHRINE 10; 10 MG/ML; UG/ML
INJECTION, SOLUTION INFILTRATION; PERINEURAL
Status: COMPLETED
Start: 2021-08-11 | End: 2021-08-11

## 2021-08-11 RX ORDER — LIDOCAINE HYDROCHLORIDE 20 MG/ML
20 INJECTION, SOLUTION INFILTRATION; PERINEURAL
Status: COMPLETED | OUTPATIENT
Start: 2021-08-11 | End: 2021-08-11

## 2021-08-11 RX ORDER — HYDROMORPHONE HYDROCHLORIDE 1 MG/ML
1 INJECTION, SOLUTION INTRAMUSCULAR; INTRAVENOUS; SUBCUTANEOUS
Status: DISCONTINUED | OUTPATIENT
Start: 2021-08-11 | End: 2021-08-13 | Stop reason: HOSPADM

## 2021-08-11 RX ORDER — MIDAZOLAM HYDROCHLORIDE 1 MG/ML
5 INJECTION, SOLUTION INTRAMUSCULAR; INTRAVENOUS
Status: DISCONTINUED | OUTPATIENT
Start: 2021-08-11 | End: 2021-08-11

## 2021-08-11 RX ADMIN — ONDANSETRON 8 MG: 2 INJECTION INTRAMUSCULAR; INTRAVENOUS at 09:37

## 2021-08-11 RX ADMIN — FENTANYL CITRATE 50 MCG: 50 INJECTION, SOLUTION INTRAMUSCULAR; INTRAVENOUS at 15:17

## 2021-08-11 RX ADMIN — FAMOTIDINE 20 MG: 10 INJECTION, SOLUTION INTRAVENOUS at 21:38

## 2021-08-11 RX ADMIN — MIDAZOLAM HYDROCHLORIDE 1 MG: 1 INJECTION, SOLUTION INTRAMUSCULAR; INTRAVENOUS at 15:24

## 2021-08-11 RX ADMIN — HEPARIN SODIUM 5000 UNITS: 5000 INJECTION INTRAVENOUS; SUBCUTANEOUS at 17:49

## 2021-08-11 RX ADMIN — CEFEPIME HYDROCHLORIDE 2 G: 2 INJECTION, POWDER, FOR SOLUTION INTRAVENOUS at 05:03

## 2021-08-11 RX ADMIN — LIDOCAINE HYDROCHLORIDE 2 ML: 20 INJECTION, SOLUTION INFILTRATION; PERINEURAL at 15:38

## 2021-08-11 RX ADMIN — Medication 10 ML: at 05:04

## 2021-08-11 RX ADMIN — CEFEPIME HYDROCHLORIDE 2 G: 2 INJECTION, POWDER, FOR SOLUTION INTRAVENOUS at 21:38

## 2021-08-11 RX ADMIN — ACETAMINOPHEN 650 MG: 325 TABLET ORAL at 03:25

## 2021-08-11 RX ADMIN — POTASSIUM BICARBONATE 40 MEQ: 782 TABLET, EFFERVESCENT ORAL at 17:49

## 2021-08-11 RX ADMIN — Medication 10 ML: at 21:39

## 2021-08-11 RX ADMIN — POTASSIUM BICARBONATE 40 MEQ: 782 TABLET, EFFERVESCENT ORAL at 09:32

## 2021-08-11 RX ADMIN — Medication 10 ML: at 16:19

## 2021-08-11 RX ADMIN — CEFEPIME HYDROCHLORIDE 2 G: 2 INJECTION, POWDER, FOR SOLUTION INTRAVENOUS at 16:20

## 2021-08-11 RX ADMIN — HEPARIN SODIUM 5000 UNITS: 5000 INJECTION INTRAVENOUS; SUBCUTANEOUS at 09:32

## 2021-08-11 RX ADMIN — MIDAZOLAM HYDROCHLORIDE 2 MG: 1 INJECTION, SOLUTION INTRAMUSCULAR; INTRAVENOUS at 15:17

## 2021-08-11 RX ADMIN — MORPHINE SULFATE 15 MG: 15 TABLET, FILM COATED, EXTENDED RELEASE ORAL at 05:59

## 2021-08-11 RX ADMIN — ONDANSETRON 4 MG: 2 INJECTION INTRAMUSCULAR; INTRAVENOUS at 06:50

## 2021-08-11 RX ADMIN — HEPARIN SODIUM 5000 UNITS: 5000 INJECTION INTRAVENOUS; SUBCUTANEOUS at 02:30

## 2021-08-11 RX ADMIN — FAMOTIDINE 20 MG: 10 INJECTION, SOLUTION INTRAVENOUS at 09:32

## 2021-08-11 RX ADMIN — FENTANYL CITRATE 25 MCG: 50 INJECTION, SOLUTION INTRAMUSCULAR; INTRAVENOUS at 15:24

## 2021-08-11 RX ADMIN — ONDANSETRON 8 MG: 2 INJECTION INTRAMUSCULAR; INTRAVENOUS at 15:06

## 2021-08-11 RX ADMIN — MIDAZOLAM HYDROCHLORIDE 1 MG: 1 INJECTION, SOLUTION INTRAMUSCULAR; INTRAVENOUS at 15:21

## 2021-08-11 RX ADMIN — LIDOCAINE HYDROCHLORIDE AND EPINEPHRINE 7 ML: 10; 10 INJECTION, SOLUTION INFILTRATION; PERINEURAL at 15:36

## 2021-08-11 RX ADMIN — MIDAZOLAM HYDROCHLORIDE 1 MG: 1 INJECTION, SOLUTION INTRAMUSCULAR; INTRAVENOUS at 15:29

## 2021-08-11 RX ADMIN — SODIUM CHLORIDE 50 ML/HR: 9 INJECTION, SOLUTION INTRAVENOUS at 15:05

## 2021-08-11 RX ADMIN — FENTANYL CITRATE 25 MCG: 50 INJECTION, SOLUTION INTRAMUSCULAR; INTRAVENOUS at 15:22

## 2021-08-11 NOTE — PROGRESS NOTES
PHYSICAL THERAPY EVALUATION/DISCHARGE  Patient: Tamera Jimenes (2 y.o. male)  Date: 8/11/2021  Primary Diagnosis: TOM (acute kidney injury) ([facility] [address] [N17.9]       Precautions:          ASSESSMENT  Based on the objective data described below, the patient presents with full AROM, normal strength, steady dynamic balance and good ambulation tolerance and mechanics s/p admission for TOM. Patient exhibits no safety concerns at this time and moves with ease, is fully indep at baseline and endorses mobility at this time consistent with that level. 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 No further needs anticipated acutely or at d/c. Functional Outcome Measure: The patient scored 100 on the Barthel outcome measure which is indicative of full independence. Other factors to consider for discharge:      Further skilled acute physical therapy is not indicated at this time. PLAN :  Recommendation for discharge: (in order for the patient to meet his/her long term goals)  No skilled physical therapy/ follow up rehabilitation needs identified at this time. This discharge recommendation:  Has not yet been discussed the attending provider and/or case management    IF patient discharges home will need the following DME: none       SUBJECTIVE:   Patient stated I want them to take this thing out of my neck.     OBJECTIVE DATA SUMMARY:   HISTORY:    Past Medical History:   Diagnosis Date    Other ill-defined conditions(799.89) 8-8-13    Hgb 15.1, K 4.1, creat 0.6, EKG: NSR, ?  inf  MI    Other ill-defined conditions(799.89)     obesity    Other ill-defined conditions(799.89) 11-22-13    Hgb 14.9, K 3.8, creat 0.8, EKG: NSR     Past Surgical History:   Procedure Laterality Date    HX KNEE ARTHROSCOPY Left at age 21    ACL repair    HX KNEE REPLACEMENT      HX ORTHOPAEDIC Right     hand    HX ORTHOPAEDIC Left 2013    TKR       Prior level of function: indep without DME  Personal factors and/or comorbidities impacting plan of care:     Home Situation  Home Environment: Private residence  # Steps to Enter: 8 (or can enter through backyard ground level basement)  One/Two Story Residence: Two story (lives in basement)  Living Alone: No  Support Systems: Family member(s)  Patient Expects to be Discharged to[de-identified] House  Current DME Used/Available at Home: None  Tub or Shower Type: Tub/Shower combination    EXAMINATION/PRESENTATION/DECISION MAKING:   Critical Behavior:  Neurologic State: Alert, Appropriate for age  Orientation Level: Oriented X4  Cognition: Appropriate decision making, Appropriate safety awareness     Hearing:       Range Of Motion:  AROM: Within functional limits           PROM: Within functional limits           Strength:    Strength: Within functional limits                    Tone & Sensation:   Tone: Normal              Sensation: Intact               Coordination:  Coordination: Within functional limits  Vision:      Functional Mobility:  Bed Mobility:  Rolling: Independent  Supine to Sit: Independent  Sit to Supine: Independent  Scooting: Independent  Transfers:  Sit to Stand: Independent  Stand to Sit: Independent        Bed to Chair: Independent              Balance:   Sitting: Intact  Standing: Intact  Ambulation/Gait Training:  Distance (ft): 200 Feet (ft)     Ambulation - Level of Assistance: Independent                                                  Functional Measure:  Barthel Index:    Bathin  Bladder: 10  Bowels: 10  Groomin  Dressing: 10  Feeding: 10  Mobility: 15  Stairs: 10  Toilet Use: 10  Transfer (Bed to Chair and Back): 15  Total: 100/100       The Barthel ADL Index: Guidelines  1. The index should be used as a record of what a patient does, not as a record of what a patient could do. 2. The main aim is to establish degree of independence from any help, physical or verbal, however minor and for whatever reason.   3. The need for supervision renders the patient not independent. 4. A patient's performance should be established using the best available evidence. Asking the patient, friends/relatives and nurses are the usual sources, but direct observation and common sense are also important. However direct testing is not needed. 5. Usually the patient's performance over the preceding 24-48 hours is important, but occasionally longer periods will be relevant. 6. Middle categories imply that the patient supplies over 50 per cent of the effort. 7. Use of aids to be independent is allowed. Domitila Sanchez., Barthel, DAxelW. (8894). Functional evaluation: the Barthel Index. [address] [facility] (14)2. Mario Buckner joshua CIELO Rojo, Danisha Madrigal., Caity Luu., Frank, [address] [place] [address] Ave (1999). Measuring the change indisability after inpatient rehabilitation; comparison of the responsiveness of the Barthel Index and Functional [place] Measure. Journal of Neurology, Neurosurgery, and Psychiatry, 66(4), 897-300. Kirill Agosto, NAxelJ.A, CONCEPCION Harden, & Radha Gill MAxelA. (2004.) Assessment of post-stroke quality of life in cost-effectiveness studies: The usefulness of the Barthel Index and the EuroQoL-5D.  Quality of Life Research, 15, 923-01            Physical Therapy Evaluation Charge Determination   History Examination Presentation Decision-Making   MEDIUM  Complexity : 1-2 comorbidities / personal factors will impact the outcome/ POC  LOW Complexity : 1-2 Standardized tests and measures addressing body structure, function, activity limitation and / or participation in recreation  MEDIUM Complexity : Evolving with changing characteristics  Other outcome measures Barthel 100  LOW       Based on the above components, the patient evaluation is determined to be of the following complexity level: LOW     Pain Rating:  None reported    Activity Tolerance:   Good      After treatment patient left in no apparent distress:   Supine in bed and Call bell within reach    COMMUNICATION/EDUCATION:   The patients plan of care was discussed with: Registered nurse. Fall prevention education was provided and the patient/caregiver indicated understanding.     Thank you for this referral.  Jose Macdonald, PT   Time Calculation: 13 mins

## 2021-08-11 NOTE — H&P
Interventional and Vascular Radiology History and Physical    Patient: Braden De La Rosa 48 y.o. male       Chief Complaint: Bacteremia    History of Present Illness: 48year old male presents for right chest port removal in setting of pseudomonas bacteremia for source control. Port was used for chemotherapy. History:    Past Medical History:   Diagnosis Date    Other ill-defined conditions(799.89) 8-8-13    Hgb 15.1, K 4.1, creat 0.6, EKG: NSR, ? inf  MI    Other ill-defined conditions(799.89)     obesity    Other ill-defined conditions(799.89) 11-22-13    Hgb 14.9, K 3.8, creat 0.8, EKG: NSR     No family history on file. Social History     Socioeconomic History    Marital status: SINGLE     Spouse name: Not on file    Number of children: Not on file    Years of education: Not on file    Highest education level: Not on file   Occupational History    Not on file   Tobacco Use    Smoking status: Current Every Day Smoker     Packs/day: 0.50     Years: 20.00     Pack years: 10.00     Types: Cigarettes   Substance and Sexual Activity    Alcohol use: No    Drug use: No    Sexual activity: Not on file   Other Topics Concern    Not on file   Social History Narrative    Not on file     Social Determinants of Health     Financial Resource Strain:     Difficulty of Paying Living Expenses:    Food Insecurity:     Worried About Running Out of Food in the Last Year:     Ran Out of Food in the Last Year:    Transportation Needs:     Lack of Transportation (Medical):      Lack of Transportation (Non-Medical):    Physical Activity:     Days of Exercise per Week:     Minutes of Exercise per Session:    Stress:     Feeling of Stress :    Social Connections:     Frequency of Communication with Friends and Family:     Frequency of Social Gatherings with Friends and Family:     Attends [employer] Services:     Active Member of Clubs or Organizations:     Attends Club or Organization Meetings:     Marital Status:    Intimate Partner Violence:     Fear of Current or Ex-Partner:     Emotionally Abused:     Physically Abused:     Sexually Abused: Allergies: No Known Allergies    Current Medications:  Current Facility-Administered Medications   Medication Dose Route Frequency    oxyCODONE IR (ROXICODONE) tablet 5 mg  5 mg Oral Q4H PRN    ondansetron (ZOFRAN ODT) tablet 8 mg  8 mg Oral Q8H PRN    Or    ondansetron (ZOFRAN) injection 8 mg  8 mg IntraVENous Q6H PRN    lidocaine (XYLOCAINE) 20 mg/mL (2 %) injection 400 mg  20 mL SubCUTAneous RAD ONCE    lidocaine-EPINEPHrine (XYLOCAINE) 1 %-1:100,000 injection        lidocaine (XYLOCAINE) 20 mg/mL (2 %) injection        fentaNYL citrate (PF) injection 200 mcg  200 mcg IntraVENous Rad Multiple    0.9% sodium chloride infusion  50 mL/hr IntraVENous CONTINUOUS    midazolam (VERSED) injection 5 mg  5 mg IntraVENous Rad Multiple    HYDROmorphone (DILAUDID) injection 1 mg  1 mg IntraVENous Q6H PRN    cefepime (MAXIPIME) 2 g in 0.9% sodium chloride (MBP/ADV) 100 mL MBP  2 g IntraVENous Q8H    potassium bicarb-citric acid (EFFER-K) tablet 40 mEq  40 mEq Oral BID    famotidine (PF) (PEPCID) 20 mg in 0.9% sodium chloride 10 mL injection  20 mg IntraVENous Q12H    heparin (porcine) injection 5,000 Units  5,000 Units SubCUTAneous Q8H    sodium chloride (NS) flush 5-40 mL  5-40 mL IntraVENous Q8H    sodium chloride (NS) flush 5-40 mL  5-40 mL IntraVENous PRN    acetaminophen (TYLENOL) tablet 650 mg  650 mg Oral Q6H PRN    Or    acetaminophen (TYLENOL) suppository 650 mg  650 mg Rectal Q6H PRN    polyethylene glycol (MIRALAX) packet 17 g  17 g Oral DAILY PRN    lactated Ringers infusion  50 mL/hr IntraVENous CONTINUOUS        Physical Exam:  Blood pressure (!) 149/99, pulse 82, temperature 98.7 °F (37.1 °C), resp. rate 15, height 5' 8\" (1.727 m), weight 82.6 kg (182 lb 1.6 oz), SpO2 100 %.   No acute distress  Good peripheral perfusion  Nonlabored respirations  Procedure site right chest site without abnormalities         Alerts:    Hospital Problems  Date Reviewed: 8/13/2013        Codes Class Noted POA    [facility]acute [facility]) [facility]) ICD-10-CM: N17.9  ICD-9-CM: 584.9  8/7/2021 Unknown              Laboratory:      Recent Labs     08/11/21  0046   HGB 10.1*   HCT 30.6*   WBC 14.0*      INR 1.2*   BUN 12   CREA 1.10   K 3.0*         Plan of Care/Planned Procedure:  Risks, benefits, and alternatives reviewed with patient and he agrees to proceed with the procedure. Conscious sedation will be performed with IV fentanyl and versed.        Fang Layne MD

## 2021-08-11 NOTE — PROGRESS NOTES
TRANSFER - IN REPORT:    Verbal report received from [facility]) on Karen Santamaria  being received from Angio(unit) for routine progression of care      Report consisted of patients Situation, Background, Assessment and   Recommendations(SBAR). Information from the following report(s) SBAR, MAR, Recent Results and Med Rec Status was reviewed with the receiving nurse. Opportunity for questions and clarification was provided. Assessment completed upon patients arrival to unit and care assumed.

## 2021-08-11 NOTE — PROGRESS NOTES
Problem: Breathing Pattern - Ineffective  Goal: *Absence of hypoxia  Outcome: Progressing Towards Goal  Goal: *Use of effective breathing techniques  Outcome: Progressing Towards Goal     Problem: Pressure Injury - Risk of  Goal: *Prevention of pressure injury  Description: Document Jareth Scale and appropriate interventions in the flowsheet. Outcome: Progressing Towards Goal  Note: Pressure Injury Interventions:  Sensory Interventions: Assess changes in LOC    Moisture Interventions: Absorbent underpads    Activity Interventions: Increase time out of bed    Mobility Interventions: HOB 30 degrees or less    Nutrition Interventions: Document food/fluid/supplement intake    Friction and Shear Interventions: Apply protective barrier, creams and emollients                Problem: Patient Education: Go to Patient Education Activity  Goal: Patient/Family Education  Outcome: Progressing Towards Goal     Problem: Falls - Risk of  Goal: *Absence of Falls  Description: Document Saad Fall Risk and appropriate interventions in the flowsheet. Outcome: Progressing Towards Goal  Note: Fall Risk Interventions:  Mobility Interventions: Communicate number of staff needed for ambulation/transfer    Mentation Interventions: Adequate sleep, hydration, pain control    Medication Interventions: Evaluate medications/consider consulting pharmacy    Elimination Interventions: Patient to call for help with toileting needs    History of Falls Interventions: Evaluate medications/consider consulting pharmacy         Problem: Risk for Spread of Infection  Goal: Prevent transmission of infectious organism to others  Description: Prevent the transmission of infectious organisms to other patients, staff members, and visitors.   Outcome: Progressing Towards Goal

## 2021-08-11 NOTE — PROGRESS NOTES
Hematology-Oncology Progress Note      Ene Rome  1970  [id number]  8/11/2021      Subjective:     Episode of vomiting last night. Sleeping soundly, but easy to arouse. Allergies: Patient has no known allergies.   Current Facility-Administered Medications   Medication Dose Route Frequency Provider Last Rate Last Admin    oxyCODONE IR (ROXICODONE) tablet 5 mg  5 mg Oral Q4H PRN Cristopher Yang, NP        cefepime (MAXIPIME) 2 g in 0.9% sodium chloride (MBP/ADV) 100 mL MBP  2 g IntraVENous Q8H OMAR Aranda  mL/hr at 08/11/21 0503 2 g at 08/11/21 0503    potassium bicarb-citric acid (EFFER-K) tablet 40 mEq  40 mEq Oral BID Clarnce [facility], DO   40 mEq at 08/10/21 1740    famotidine (PF) (PEPCID) 20 mg in 0.9% sodium chloride 10 mL injection  20 mg IntraVENous Q12H Jose Villalobos MD   20 mg at 08/10/21 2014    heparin (porcine) injection 5,000 Units  5,000 Units SubCUTAneous Q8H Clarnce [facility], DO   5,000 Units at 08/11/21 0230    sodium chloride (NS) flush 5-40 mL  5-40 mL IntraVENous Q8H Jose Villalobos MD   10 mL at 08/11/21 0504    sodium chloride (NS) flush 5-40 mL  5-40 mL IntraVENous PRN Jose Villalobos MD        acetaminophen (TYLENOL) tablet 650 mg  650 mg Oral Q6H PRN Jose Villalobos MD   650 mg at 08/11/21 0325    Or    acetaminophen (TYLENOL) suppository 650 mg  650 mg Rectal Q6H PRN Jose Villalobos MD   650 mg at 08/07/21 0446    polyethylene glycol (MIRALAX) packet 17 g  17 g Oral DAILY PRN Jose Villalobos MD        ondansetron (ZOFRAN ODT) tablet 4 mg  4 mg Oral Q8H PRN Jose Villalobos MD        Or    ondansetron [facility]) injection 4 mg  4 mg IntraVENous Q6H PRN Jose Villalobos MD   4 mg at 08/11/21 0650    lactated Ringers infusion  50 mL/hr IntraVENous CONTINUOUS Peg Cogan, MD 50 mL/hr at 08/10/21 1126 50 mL/hr at 08/10/21 1126    [Held by provider] HYDROmorphone (PF) 25 mg/50 mL (DILAUDID) PCA   IntraVENous CONTINUOUS [place] Bilshaan, NP   Rate Verify at 08/09/21 2927     Objective: Patient Vitals for the past 24 hrs:   BP Temp Pulse Resp SpO2   08/11/21 0037 (!) 140/96 98.8 °F (37.1 °C) (!) 107 16 97 %   08/10/21 1941 (!) 164/106 99.5 °F (37.5 °C) (!) 101 16 97 %   08/10/21 1447 131/86 98.8 °F (37.1 °C) 97 18 94 %   08/10/21 0906 (!) 158/98 99.3 °F (37.4 °C) 97 18 96 %       Gen: NAD  HEENT: PERRL, Sclerae anicteric  Declined my request to examine his abdomen  Ext: No c/c/e    Available labs reviewed:  Labs:    Recent Results (from the past 24 hour(s))   VANCOMYCIN, RANDOM    Collection Time: 08/10/21 10:26 AM   Result Value Ref Range    Vancomycin, random 12.7 UG/ML   CBC WITH AUTOMATED DIFF    Collection Time: 08/11/21 12:46 AM   Result Value Ref Range    WBC 14.0 (H) 4.1 - 11.1 K/uL    RBC 3.32 (L) 4.10 - 5.70 M/uL    HGB 10.1 (L) 12.1 - 17.0 g/dL    HCT 30.6 (L) 36.6 - 50.3 %    MCV 92.2 80.0 - 99.0 FL    MCH 30.4 26.0 - 34.0 PG    MCHC 33.0 30.0 - 36.5 g/dL    RDW 12.0 11.5 - 14.5 %    PLATELET 417 171 - 667 K/uL    MPV 10.3 8.9 - 12.9 FL    NRBC 0.0 0  WBC    ABSOLUTE NRBC 0.00 0.00 - 0.01 K/uL    NEUTROPHILS 76 (H) 32 - 75 %    BAND NEUTROPHILS 5 0 - 6 %    LYMPHOCYTES 8 (L) 12 - 49 %    MONOCYTES 7 5 - 13 %    EOSINOPHILS 3 0 - 7 %    BASOPHILS 0 0 - 1 %    METAMYELOCYTES 1 (H) 0 %    IMMATURE GRANULOCYTES 0 %    ABS. NEUTROPHILS 11.3 (H) 1.8 - 8.0 K/UL    ABS. LYMPHOCYTES 1.1 0.8 - 3.5 K/UL    ABS. MONOCYTES 1.0 0.0 - 1.0 K/UL    ABS. EOSINOPHILS 0.4 0.0 - 0.4 K/UL    ABS. BASOPHILS 0.0 0.0 - 0.1 K/UL    ABS. IMM.  GRANS. 0.0 K/UL    DF MANUAL      RBC COMMENTS NORMOCYTIC, NORMOCHROMIC      WBC COMMENTS TOXIC GRANULATION     LACTIC ACID    Collection Time: 08/11/21 12:46 AM   Result Value Ref Range    Lactic acid 1.1 0.4 - 2.0 MMOL/L   PROTHROMBIN TIME + INR    Collection Time: 08/11/21 12:46 AM   Result Value Ref Range    INR 1.2 (H) 0.9 - 1.1      Prothrombin time 12.6 (H) 9.0 - 83.7 sec   METABOLIC PANEL, COMPREHENSIVE    Collection Time: 08/11/21 12:46 AM   Result Value Ref Range    Sodium 139 136 - 145 mmol/L    Potassium 3.0 (L) 3.5 - 5.1 mmol/L    Chloride 107 97 - 108 mmol/L    CO2 25 21 - 32 mmol/L    Anion gap 7 5 - 15 mmol/L    Glucose 90 65 - 100 mg/dL    BUN 12 6 - 20 MG/DL    Creatinine 1.10 0.70 - 1.30 MG/DL    BUN/Creatinine ratio 11 (L) 12 - 20      GFR est AA >60 >60 ml/min/1.73m2    GFR est non-AA >60 >60 ml/min/1.73m2    Calcium 8.0 (L) 8.5 - 10.1 MG/DL    Bilirubin, total 0.6 0.2 - 1.0 MG/DL    ALT (SGPT) 26 12 - 78 U/L    AST (SGOT) 18 15 - 37 U/L    Alk. phosphatase 110 45 - 117 U/L    Protein, total 5.8 (L) 6.4 - 8.2 g/dL    Albumin 2.4 (L) 3.5 - 5.0 g/dL    Globulin 3.4 2.0 - 4.0 g/dL    A-G Ratio 0.7 (L) 1.1 - 2.2     MAGNESIUM    Collection Time: 08/11/21 12:46 AM   Result Value Ref Range    Magnesium 1.4 (L) 1.6 - 2.4 mg/dL   PHOSPHORUS    Collection Time: 08/11/21 12:46 AM   Result Value Ref Range    Phosphorus 1.5 (L) 2.6 - 4.7 MG/DL   URIC ACID    Collection Time: 08/11/21 12:46 AM   Result Value Ref Range    Uric acid 3.6 3.5 - 7.2 MG/DL         Assessment and Plan     47 y/o man with locally advanced gastric CA, seen by the [facility] system in [facility], now admitted with neutropenic fever after treatment with FLOT (docetaxel held), ARF. 1. N. Fever: ANC has improved. GCSF held. Noted to have pseudomonas bacteremia (cultures from outside hospital), seen by ID with plans for port removal.     2. Gastric CA: Defer overarching plan to outpatient oncologist, will need to see him/her in clinic prior to further cytotoxic chemotherapy. 3. ARF: resolved with normal creatinine    4. Vomiting last PM: Declined my request for exam. Zofran increased from 4 mg to 8 mg. Recommend discharging on 8 mg Zofran ODT dose. Thank you for allowing us to participate in the care of this very pleasant patient.     Dilan Tavares MD  Hematology/Oncology  Phone [phone]

## 2021-08-11 NOTE — ROUTINE PROCESS
TRANSFER - OUT REPORT:    Verbal report given to Vangie(name) on Lena Re  being transferred to 603(unit) for routine progression of care       Report consisted of patients Situation, Background, Assessment and   Recommendations(SBAR). Information from the following report(s) SBAR, Procedure Summary and MAR was reviewed with the receiving nurse. Lines:   Peripheral IV 08/11/21 Anterior;Distal;Left Forearm (Active)        Opportunity for questions and clarification was provided.       Patient transported with:   [employer]

## 2021-08-11 NOTE — PROGRESS NOTES
TOM resolved  Will sign off  Please call us back with any issues        Loren Spears MD  [facility]   [address] [facility] [address]  Phone - [phone]   Fax - [phone]  [email or web address]

## 2021-08-11 NOTE — PROGRESS NOTES
Pt arrives via stretcher to angio department accompanied by the transporter for port removal procedure. All assessments completed and consent was reviewed. Education given was regarding procedure, conscious sedation, post-procedure care and  management/follow-up. Opportunity for questions was provided and all questions and concerns were addressed.

## 2021-08-11 NOTE — PROGRESS NOTES
[facility]  Hospitalist Group  Benigno Zaman MD      Progress Note        PATIENT ID: Lena Higgins  MRN: [id number]   YOB: 1970    PRIMARY CARE PROVIDER: None   DATE OF ADMISSION: 8/7/2021  2:55 AM        Brief admission history    HPI: 50M w/ hx of gastric adenocarcinoma diagnosed in January 2021, being seen at [facility] oncology under going chemotherapy, last dose of CHEMO FOLFIRI on 7/27/2021, also notable for abdominal lymphadenopathy, HLD, GERD, chronic pain on home opioid medication (oxycodone 10-15 q4h MS contin 15mg BID) presented to [address] [place] [address] for 4 days of nausea/vomiting, patient was noted to have Cr of 19.2, , K 6.6, patient received 6L of NS then started on D51/4 saline. Patient was also given Veltassa for hyperkalemia. On arrival to [facility] ICU patient is alert and awake, tachycardic in the 140's with norepinephrine at 16 mcg/min. Nephrology consulted to initiate CRRT. Pt c/o nausea/abdominal tenderness. Transferred out of ICU 8/9    Subjective  --Meds noted, he has nausea and vomiting. Nausea and vomiting better. He has mild epigastric tenderness on exam.    Assessment and Plan:  Neutropenic sepsis. Neutropenia resolved- 0.7-> 1.3->4.2. Pseudomonas bacteremia  - BC from OSH grew pseudomonas (see result in the media section)  -UA negative. CT chest ,abdomen and pelvis,no CT explanation for the patient's sepsis. -Blood cx from 8/7 here,NGSF. MRSA swab negative  -D/c vancomycin   -ID following. Plan for Port-A-Cath removal.    Profound TOM ,predominantly prerenal 2/2 profound hypovolemia due to GI loss  - Improving creatinine 1.27(11.90 on admission)   - On dose of rasburicase given.    - Continue IV fluids   - Nephrology following    Intractable n/v/d,improved related to gastric cancer  - Continue IV fluids   - Continue antiemetics  - C- diff negative    Gastric adenocarcinoma- mets   - Oncology following   -Patient does not report much pain.    Hypokalemia   - replace and monitor     Hypomagnesemia  - Likely secondary to volume depletion   - Replace   - Monitor labs and replace as needed     Hypophosphatemia   - Replace   - Monitor labs and replace as needed             Code status: Full  VTE px: SCD             PHYSICAL EXAMINATION:  Visit Vitals  BP (!) 156/109 (BP 1 Location: Left upper arm, BP Patient Position: At rest)   Pulse 90   Temp 98.7 °F (37.1 °C)   Resp 16   Ht 5' 8\" (1.727 m)   Wt 82.6 kg (182 lb 1.6 oz)   SpO2 98%   BMI 27.69 kg/m²       General:          Alert, cooperative, no distress  HEENT:           Atraumatic, MMM            Neck:               Supple, symmetrical,  thyroid: non tender  Lungs:             Clear to auscultation bilaterally. No Wheezing or Rhonchi. No rales. Heart:              Regular  rhythm,  No  murmur   No edema  Abdomen:       Soft, mild epigastric tenderness without rebound or guarding. Not distended. Bowel sounds normal  Extremities:     No cyanosis. No clubbing,  +2 distal pulses  Skin:                Not pale. Not Jaundiced  No rashes   Psych:             Not anxious or agitated. Neurologic:      Alert, moves all extremities, oriented X 3. CMP:   Lab Results   Component Value Date/Time     08/11/2021 12:46 AM    K 3.0 (L) 08/11/2021 12:46 AM     08/11/2021 12:46 AM    CO2 25 08/11/2021 12:46 AM    AGAP 7 08/11/2021 12:46 AM    GLU 90 08/11/2021 12:46 AM    BUN 12 08/11/2021 12:46 AM    CREA 1.10 08/11/2021 12:46 AM    GFRAA >60 08/11/2021 12:46 AM    GFRNA >60 08/11/2021 12:46 AM    CA 8.0 (L) 08/11/2021 12:46 AM    MG 1.4 (L) 08/11/2021 12:46 AM    PHOS 1.5 (L) 08/11/2021 12:46 AM    ALB 2.4 (L) 08/11/2021 12:46 AM    TP 5.8 (L) 08/11/2021 12:46 AM    GLOB 3.4 08/11/2021 12:46 AM    AGRAT 0.7 (L) 08/11/2021 12:46 AM    ALT 26 08/11/2021 12:46 AM     No results found for this or any previous visit (from the past 12 hour(s)).   Past Medical History:   Diagnosis Date    Other ill-defined conditions(799.89) 8-8-13    Hgb 15.1, K 4.1, creat 0.6, EKG: NSR, ? inf  MI    Other ill-defined conditions(799.89)     obesity    Other ill-defined conditions(799.89) 11-22-13    Hgb 14.9, K 3.8, creat 0.8, EKG: NSR     None     None   Lab reviewed  Imaging reviewed  Orders reviewed  Consultants note and other IDR notes reviewed. Signed:    Angie Beyer MD  Date of Service:  8/11/2021

## 2021-08-12 LAB
ALBUMIN SERPL-MCNC: 2.4 G/DL (ref 3.5–5)
ALBUMIN/GLOB SERPL: 0.7 {RATIO} (ref 1.1–2.2)
ALP SERPL-CCNC: 123 U/L (ref 45–117)
ALT SERPL-CCNC: 25 U/L (ref 12–78)
ANION GAP SERPL CALC-SCNC: 6 MMOL/L (ref 5–15)
AST SERPL-CCNC: 15 U/L (ref 15–37)
BACTERIA SPEC CULT: NORMAL
BASOPHILS # BLD: 0.3 K/UL (ref 0–0.1)
BASOPHILS NFR BLD: 2 % (ref 0–1)
BILIRUB SERPL-MCNC: 0.4 MG/DL (ref 0.2–1)
BUN SERPL-MCNC: 11 MG/DL (ref 6–20)
BUN/CREAT SERPL: 10 (ref 12–20)
CALCIUM SERPL-MCNC: 7.9 MG/DL (ref 8.5–10.1)
CHLORIDE SERPL-SCNC: 107 MMOL/L (ref 97–108)
CO2 SERPL-SCNC: 26 MMOL/L (ref 21–32)
CREAT SERPL-MCNC: 1.07 MG/DL (ref 0.7–1.3)
DIFFERENTIAL METHOD BLD: ABNORMAL
EOSINOPHIL # BLD: 0.4 K/UL (ref 0–0.4)
EOSINOPHIL NFR BLD: 3 % (ref 0–7)
ERYTHROCYTE [DISTWIDTH] IN BLOOD BY AUTOMATED COUNT: 12.3 % (ref 11.5–14.5)
GLOBULIN SER CALC-MCNC: 3.4 G/DL (ref 2–4)
GLUCOSE SERPL-MCNC: 80 MG/DL (ref 65–100)
HCT VFR BLD AUTO: 30.2 % (ref 36.6–50.3)
HGB BLD-MCNC: 10.1 G/DL (ref 12.1–17)
IMM GRANULOCYTES # BLD AUTO: 0 K/UL
IMM GRANULOCYTES NFR BLD AUTO: 0 %
INR PPP: 1.2 (ref 0.9–1.1)
LACTATE SERPL-SCNC: 1.1 MMOL/L (ref 0.4–2)
LYMPHOCYTES # BLD: 1.4 K/UL (ref 0.8–3.5)
LYMPHOCYTES NFR BLD: 11 % (ref 12–49)
MAGNESIUM SERPL-MCNC: 1.6 MG/DL (ref 1.6–2.4)
MCH RBC QN AUTO: 31.3 PG (ref 26–34)
MCHC RBC AUTO-ENTMCNC: 33.4 G/DL (ref 30–36.5)
MCV RBC AUTO: 93.5 FL (ref 80–99)
METAMYELOCYTES NFR BLD MANUAL: 1 %
MONOCYTES # BLD: 0.5 K/UL (ref 0–1)
MONOCYTES NFR BLD: 4 % (ref 5–13)
NEUTS BAND NFR BLD MANUAL: 3 % (ref 0–6)
NEUTS SEG # BLD: 10 K/UL (ref 1.8–8)
NEUTS SEG NFR BLD: 76 % (ref 32–75)
NRBC # BLD: 0 K/UL (ref 0–0.01)
NRBC BLD-RTO: 0 PER 100 WBC
PLATELET # BLD AUTO: 248 K/UL (ref 150–400)
PMV BLD AUTO: 9.9 FL (ref 8.9–12.9)
POTASSIUM SERPL-SCNC: 3.4 MMOL/L (ref 3.5–5.1)
PROT SERPL-MCNC: 5.8 G/DL (ref 6.4–8.2)
PROTHROMBIN TIME: 12.1 SEC (ref 9–11.1)
RBC # BLD AUTO: 3.23 M/UL (ref 4.1–5.7)
RBC MORPH BLD: ABNORMAL
SERVICE CMNT-IMP: NORMAL
SODIUM SERPL-SCNC: 139 MMOL/L (ref 136–145)
WBC # BLD AUTO: 12.7 K/UL (ref 4.1–11.1)

## 2021-08-12 PROCEDURE — 74011000258 HC RX REV CODE- 258: Performed by: HOSPITALIST

## 2021-08-12 PROCEDURE — 74011000250 HC RX REV CODE- 250: Performed by: INTERNAL MEDICINE

## 2021-08-12 PROCEDURE — 74011250637 HC RX REV CODE- 250/637: Performed by: HOSPITALIST

## 2021-08-12 PROCEDURE — 80053 COMPREHEN METABOLIC PANEL: CPT

## 2021-08-12 PROCEDURE — 83735 ASSAY OF MAGNESIUM: CPT

## 2021-08-12 PROCEDURE — 36415 COLL VENOUS BLD VENIPUNCTURE: CPT

## 2021-08-12 PROCEDURE — 65270000029 HC RM PRIVATE

## 2021-08-12 PROCEDURE — 83605 ASSAY OF LACTIC ACID: CPT

## 2021-08-12 PROCEDURE — 85610 PROTHROMBIN TIME: CPT

## 2021-08-12 PROCEDURE — 74011000250 HC RX REV CODE- 250: Performed by: HOSPITALIST

## 2021-08-12 PROCEDURE — 74011250637 HC RX REV CODE- 250/637: Performed by: INTERNAL MEDICINE

## 2021-08-12 PROCEDURE — 74011250636 HC RX REV CODE- 250/636: Performed by: HEALTH CARE PROVIDER

## 2021-08-12 PROCEDURE — 85025 COMPLETE CBC W/AUTO DIFF WBC: CPT

## 2021-08-12 PROCEDURE — 74011250637 HC RX REV CODE- 250/637: Performed by: HEALTH CARE PROVIDER

## 2021-08-12 PROCEDURE — 74011250636 HC RX REV CODE- 250/636: Performed by: HOSPITALIST

## 2021-08-12 PROCEDURE — 74011250636 HC RX REV CODE- 250/636: Performed by: INTERNAL MEDICINE

## 2021-08-12 RX ORDER — LOPERAMIDE HYDROCHLORIDE 2 MG/1
2 CAPSULE ORAL
Status: DISCONTINUED | OUTPATIENT
Start: 2021-08-12 | End: 2021-08-13 | Stop reason: HOSPADM

## 2021-08-12 RX ORDER — HYDROCODONE BITARTRATE AND ACETAMINOPHEN 7.5; 325 MG/15ML; MG/15ML
10 SOLUTION ORAL
Status: DISCONTINUED | OUTPATIENT
Start: 2021-08-12 | End: 2021-08-13 | Stop reason: HOSPADM

## 2021-08-12 RX ADMIN — ACETAMINOPHEN 650 MG: 325 TABLET ORAL at 05:56

## 2021-08-12 RX ADMIN — Medication 10 ML: at 09:20

## 2021-08-12 RX ADMIN — FAMOTIDINE 20 MG: 10 INJECTION, SOLUTION INTRAVENOUS at 09:18

## 2021-08-12 RX ADMIN — ACETAMINOPHEN 650 MG: 325 TABLET ORAL at 20:30

## 2021-08-12 RX ADMIN — CEFEPIME HYDROCHLORIDE 2 G: 2 INJECTION, POWDER, FOR SOLUTION INTRAVENOUS at 20:28

## 2021-08-12 RX ADMIN — Medication 10 ML: at 21:17

## 2021-08-12 RX ADMIN — HEPARIN SODIUM 5000 UNITS: 5000 INJECTION INTRAVENOUS; SUBCUTANEOUS at 09:18

## 2021-08-12 RX ADMIN — Medication 10 ML: at 14:11

## 2021-08-12 RX ADMIN — Medication 10 ML: at 05:45

## 2021-08-12 RX ADMIN — CEFEPIME HYDROCHLORIDE 2 G: 2 INJECTION, POWDER, FOR SOLUTION INTRAVENOUS at 14:10

## 2021-08-12 RX ADMIN — FAMOTIDINE 20 MG: 10 INJECTION, SOLUTION INTRAVENOUS at 20:25

## 2021-08-12 RX ADMIN — HYDROMORPHONE HYDROCHLORIDE 1 MG: 1 INJECTION, SOLUTION INTRAMUSCULAR; INTRAVENOUS; SUBCUTANEOUS at 14:15

## 2021-08-12 RX ADMIN — POTASSIUM BICARBONATE 40 MEQ: 782 TABLET, EFFERVESCENT ORAL at 09:17

## 2021-08-12 RX ADMIN — CEFEPIME HYDROCHLORIDE 2 G: 2 INJECTION, POWDER, FOR SOLUTION INTRAVENOUS at 05:45

## 2021-08-12 RX ADMIN — HYDROCODONE BITARTRATE AND ACETAMINOPHEN 10 MG: 7.5; 325 SOLUTION ORAL at 23:54

## 2021-08-12 NOTE — PROGRESS NOTES
Hematology-Oncology Progress Note      Alma Velasquez  1970  [id number]  8/12/2021      Subjective:     Several episodes of diarrhea last night    Allergies: Patient has no known allergies.   Current Facility-Administered Medications   Medication Dose Route Frequency Provider Last Rate Last Admin    oxyCODONE IR (ROXICODONE) tablet 5 mg  5 mg Oral Q4H PRN [place] NP        ondansetron (ZOFRAN ODT) tablet 8 mg  8 mg Oral Q8H PRN Sharon Borges MD        Or    ondansetron [facility]) injection 8 mg  8 mg IntraVENous Q6H PRN Sharon Borges MD   8 mg at 08/11/21 1506    HYDROmorphone (DILAUDID) injection 1 mg  1 mg IntraVENous Q6H PRN Abraham Aranda MD        cefepime (MAXIPIME) 2 g in 0.9% sodium chloride (MBP/ADV) 100 mL MBP  2 g IntraVENous Q8H DANISHA Aranda  mL/hr at 08/12/21 0545 2 g at 08/12/21 0545    potassium bicarb-citric acid (EFFER-K) tablet 40 mEq  40 mEq Oral BID Rosalva Fujguerrero, DO   40 mEq at 08/12/21 0917    famotidine (PF) (PEPCID) 20 mg in 0.9% sodium chloride 10 mL injection  20 mg IntraVENous Q12H Lia Lozano MD   20 mg at 08/12/21 0918    heparin (porcine) injection 5,000 Units  5,000 Units SubCUTAneous Q8H Rosalva Becker DO   5,000 Units at 08/12/21 0918    sodium chloride (NS) flush 5-40 mL  5-40 mL IntraVENous Q8H Lia Lozano MD   10 mL at 08/12/21 0545    sodium chloride (NS) flush 5-40 mL  5-40 mL IntraVENous PRN Lia Lozano MD   10 mL at 08/12/21 0920    acetaminophen (TYLENOL) tablet 650 mg  650 mg Oral Q6H PRN Lia Lozano MD   650 mg at 08/12/21 0556    Or    acetaminophen (TYLENOL) suppository 650 mg  650 mg Rectal Q6H PRN Lia Lozano MD   650 mg at 08/07/21 0446    polyethylene glycol (MIRALAX) packet 17 g  17 g Oral DAILY PRN Lia Lozano MD        lactated Ringers infusion  50 mL/hr IntraVENous CONTINUOUS Sandra Morales MD 50 mL/hr at 08/10/21 1126 50 mL/hr at 08/10/21 1126     Objective:     Patient Vitals for the past 24 hrs:   BP Temp Pulse Resp SpO2   08/12/21 0824 (!) 147/99 98.3 °F (36.8 °C) 90 18 97 %   08/12/21 0245 104/66 98.7 °F (37.1 °C) 97 12 96 %   08/11/21 2028 125/79 98 °F (36.7 °C) 89 12 96 %   08/11/21 1558 133/87 -- 93 12 96 %   08/11/21 1552 (!) 127/96 99 °F (37.2 °C) 92 12 97 %   08/11/21 1540 118/83 -- 96 11 97 %   08/11/21 1535 124/81 -- (!) 101 13 97 %   08/11/21 1530 (!) 135/92 -- 98 15 98 %   08/11/21 1525 (!) 135/92 -- 93 16 100 %   08/11/21 1520 (!) 139/90 -- 86 17 100 %   08/11/21 1515 (!) 149/99 -- 82 15 100 %   08/11/21 1419 (!) 156/109 98.7 °F (37.1 °C) 90 16 98 %   08/11/21 1359 133/84 98.9 °F (37.2 °C) (!) 102 18 98 %   08/11/21 0943 (!) 157/95 98.9 °F (37.2 °C) (!) 102 22 96 %       Gen: NAD  HEENT: PERRL, Sclerae anicteric  Lungs clear  Cor rrr  Abd. Soft non tend  Ext no cce    Available labs reviewed:  Labs:    Recent Results (from the past 24 hour(s))   CBC WITH AUTOMATED DIFF    Collection Time: 08/12/21  2:01 AM   Result Value Ref Range    WBC 12.7 (H) 4.1 - 11.1 K/uL    RBC 3.23 (L) 4.10 - 5.70 M/uL    HGB 10.1 (L) 12.1 - 17.0 g/dL    HCT 30.2 (L) 36.6 - 50.3 %    MCV 93.5 80.0 - 99.0 FL    MCH 31.3 26.0 - 34.0 PG    MCHC 33.4 30.0 - 36.5 g/dL    RDW 12.3 11.5 - 14.5 %    PLATELET 068 746 - 588 K/uL    MPV 9.9 8.9 - 12.9 FL    NRBC 0.0 0  WBC    ABSOLUTE NRBC 0.00 0.00 - 0.01 K/uL    NEUTROPHILS 76 (H) 32 - 75 %    BAND NEUTROPHILS 3 0 - 6 %    LYMPHOCYTES 11 (L) 12 - 49 %    MONOCYTES 4 (L) 5 - 13 %    EOSINOPHILS 3 0 - 7 %    BASOPHILS 2 (H) 0 - 1 %    METAMYELOCYTES 1 (H) 0 %    IMMATURE GRANULOCYTES 0 %    ABS. NEUTROPHILS 10.0 (H) 1.8 - 8.0 K/UL    ABS. LYMPHOCYTES 1.4 0.8 - 3.5 K/UL    ABS. MONOCYTES 0.5 0.0 - 1.0 K/UL    ABS. EOSINOPHILS 0.4 0.0 - 0.4 K/UL    ABS. BASOPHILS 0.3 (H) 0.0 - 0.1 K/UL    ABS. IMM.  GRANS. 0.0 K/UL    DF MANUAL      RBC COMMENTS NORMOCYTIC, NORMOCHROMIC     LACTIC ACID    Collection Time: 08/12/21  2:01 AM   Result Value Ref Range    Lactic acid 1.1 0.4 - 2.0 MMOL/L PROTHROMBIN TIME + INR    Collection Time: 08/12/21  2:01 AM   Result Value Ref Range    INR 1.2 (H) 0.9 - 1.1      Prothrombin time 12.1 (H) 9.0 - 11.1 sec   MAGNESIUM    Collection Time: 08/12/21  2:01 AM   Result Value Ref Range    Magnesium 1.6 1.6 - 2.4 mg/dL   METABOLIC PANEL, COMPREHENSIVE    Collection Time: 08/12/21  2:01 AM   Result Value Ref Range    Sodium 139 136 - 145 mmol/L    Potassium 3.4 (L) 3.5 - 5.1 mmol/L    Chloride 107 97 - 108 mmol/L    CO2 26 21 - 32 mmol/L    Anion gap 6 5 - 15 mmol/L    Glucose 80 65 - 100 mg/dL    BUN 11 6 - 20 MG/DL    Creatinine 1.07 0.70 - 1.30 MG/DL    BUN/Creatinine ratio 10 (L) 12 - 20      GFR est AA >60 >60 ml/min/1.73m2    GFR est non-AA >60 >60 ml/min/1.73m2    Calcium 7.9 (L) 8.5 - 10.1 MG/DL    Bilirubin, total 0.4 0.2 - 1.0 MG/DL    ALT (SGPT) 25 12 - 78 U/L    AST (SGOT) 15 15 - 37 U/L    Alk. phosphatase 123 (H) 45 - 117 U/L    Protein, total 5.8 (L) 6.4 - 8.2 g/dL    Albumin 2.4 (L) 3.5 - 5.0 g/dL    Globulin 3.4 2.0 - 4.0 g/dL    A-G Ratio 0.7 (L) 1.1 - 2.2           Assessment and Plan     47 y/o man with locally advanced gastric CA, seen by the [facility] system in [place], now admitted with neutropenic fever after treatment with FLOT (docetaxel held), ARF. 1. N. Fever: ANC has improved. GCSF held. Noted to have pseudomonas bacteremia (cultures from outside hospital), seen by ID , port removed. Defer duration of abx to ID    2. Gastric CA: Defer overarching plan to outpatient oncologist in [address] [place] [address]. .   will need to see him/her in clinic prior to further cytotoxic chemotherapy. 3. ARF: resolved with normal creatinine    4. Diarrhea persists. . continue imodium , ivf    Sarai Real MD

## 2021-08-12 NOTE — PROGRESS NOTES
[facility]  Angie Beyer MD      Progress Note        PATIENT ID: Trevor Aldridge  MRN: [id number]   YOB: 1970    PRIMARY CARE PROVIDER: None   DATE OF ADMISSION: 8/7/2021  2:55 AM        Brief admission history    HPI: 50M w/ hx of gastric adenocarcinoma diagnosed in January 2021, being seen at [facility] oncology under going chemotherapy, last dose of CHEMO FOLFIRI on 7/27/2021, also notable for abdominal lymphadenopathy, HLD, GERD, chronic pain on home opioid medication (oxycodone 10-15 q4h MS contin 15mg BID) presented to [address] [place] [address] for 4 days of nausea/vomiting, patient was noted to have Cr of 19.2, , K 6.6, patient received 6L of NS then started on D51/4 saline. Patient was also given Veltassa for hyperkalemia. On arrival to [facility] ICU patient is alert and awake, tachycardic in the 140's with norepinephrine at 16 mcg/min. Nephrology consulted to initiate CRRT. Pt c/o nausea/abdominal tenderness. Transferred out of ICU 8/9    Subjective  --Asked if he can get pain med for abd pain,he said he declined it earlier. Assessment and Plan:  Neutropenic sepsis. Neutropenia resolved- 0.7-> 1.3->4.2. Pseudomonas bacteremia  - BC from OSH grew pseudomonas (see result in the media section)  -UA negative. CT chest ,abdomen and pelvis,no CT explanation for the patient's sepsis. -Blood cx from 8/7 here,NGSF. MRSA swab negative  -D/c vancomycin   -ID following. Port cath removed 8/11  -Discussed with ID,will check for sensitivities on the pseudomonas  from outside lab for Levaquin,will d/c on oral  Levaquin if sensitive does now show levaquin    Profound TOM ,predominantly prerenal 2/2 profound hypovolemia due to GI loss  - Improving creatinine 1.27(11.90 on admission)   - On dose of rasburicase given.    - Continue IV fluids   - Nephrology following    Intractable n/v/d,improved related to gastric cancer  - Continue IV fluids   - Continue antiemetics  - C- diff negative    Gastric adenocarcinoma- mets   - Oncology following   -Patient does not report much pain. Hypokalemia   - replace and monitor     Hypomagnesemia  - Likely secondary to volume depletion   - Replace   - Monitor labs and replace as needed     Hypophosphatemia   - Replace   - Monitor labs and replace as needed             Code status: Full  VTE px: SCD    Home in 1-2 days          PHYSICAL EXAMINATION:  Visit Vitals  BP (!) 147/99 (BP 1 Location: Left upper arm, BP Patient Position: At rest)   Pulse 90   Temp 98.3 °F (36.8 °C)   Resp 18   Ht 5' 8\" (1.727 m)   Wt 82.6 kg (182 lb 1.6 oz)   SpO2 97%   BMI 27.69 kg/m²       General:          Alert, cooperative, no distress  HEENT:           Atraumatic, MMM            Neck:               Supple, symmetrical,  thyroid: non tender  Lungs:             Clear to auscultation bilaterally. No Wheezing or Rhonchi. No rales. Heart:              Regular  rhythm,  No  murmur   No edema  Abdomen:       Soft, mild epigastric tenderness without rebound or guarding. Not distended. Bowel sounds normal  Extremities:     No cyanosis. No clubbing,  +2 distal pulses  Skin:                Not pale. Not Jaundiced  No rashes   Psych:             Not anxious or agitated. Neurologic:      Alert, moves all extremities, oriented X 3.      CMP:   Lab Results   Component Value Date/Time     08/12/2021 02:01 AM    K 3.4 (L) 08/12/2021 02:01 AM     08/12/2021 02:01 AM    CO2 26 08/12/2021 02:01 AM    AGAP 6 08/12/2021 02:01 AM    GLU 80 08/12/2021 02:01 AM    BUN 11 08/12/2021 02:01 AM    CREA 1.07 08/12/2021 02:01 AM    GFRAA >60 08/12/2021 02:01 AM    GFRNA >60 08/12/2021 02:01 AM    CA 7.9 (L) 08/12/2021 02:01 AM    MG 1.6 08/12/2021 02:01 AM    ALB 2.4 (L) 08/12/2021 02:01 AM    TP 5.8 (L) 08/12/2021 02:01 AM    GLOB 3.4 08/12/2021 02:01 AM    AGRAT 0.7 (L) 08/12/2021 02:01 AM    ALT 25 08/12/2021 02:01 AM     Recent Results (from the past 12 hour(s))   CBC WITH AUTOMATED DIFF    Collection Time: 08/12/21  2:01 AM   Result Value Ref Range    WBC 12.7 (H) 4.1 - 11.1 K/uL    RBC 3.23 (L) 4.10 - 5.70 M/uL    HGB 10.1 (L) 12.1 - 17.0 g/dL    HCT 30.2 (L) 36.6 - 50.3 %    MCV 93.5 80.0 - 99.0 FL    MCH 31.3 26.0 - 34.0 PG    MCHC 33.4 30.0 - 36.5 g/dL    RDW 12.3 11.5 - 14.5 %    PLATELET 348 998 - 044 K/uL    MPV 9.9 8.9 - 12.9 FL    NRBC 0.0 0  WBC    ABSOLUTE NRBC 0.00 0.00 - 0.01 K/uL    NEUTROPHILS 76 (H) 32 - 75 %    BAND NEUTROPHILS 3 0 - 6 %    LYMPHOCYTES 11 (L) 12 - 49 %    MONOCYTES 4 (L) 5 - 13 %    EOSINOPHILS 3 0 - 7 %    BASOPHILS 2 (H) 0 - 1 %    METAMYELOCYTES 1 (H) 0 %    IMMATURE GRANULOCYTES 0 %    ABS. NEUTROPHILS 10.0 (H) 1.8 - 8.0 K/UL    ABS. LYMPHOCYTES 1.4 0.8 - 3.5 K/UL    ABS. MONOCYTES 0.5 0.0 - 1.0 K/UL    ABS. EOSINOPHILS 0.4 0.0 - 0.4 K/UL    ABS. BASOPHILS 0.3 (H) 0.0 - 0.1 K/UL    ABS. IMM.  GRANS. 0.0 K/UL    DF MANUAL      RBC COMMENTS NORMOCYTIC, NORMOCHROMIC     LACTIC ACID    Collection Time: 08/12/21  2:01 AM   Result Value Ref Range    Lactic acid 1.1 0.4 - 2.0 MMOL/L   PROTHROMBIN TIME + INR    Collection Time: 08/12/21  2:01 AM   Result Value Ref Range    INR 1.2 (H) 0.9 - 1.1      Prothrombin time 12.1 (H) 9.0 - 11.1 sec   MAGNESIUM    Collection Time: 08/12/21  2:01 AM   Result Value Ref Range    Magnesium 1.6 1.6 - 2.4 mg/dL   METABOLIC PANEL, COMPREHENSIVE    Collection Time: 08/12/21  2:01 AM   Result Value Ref Range    Sodium 139 136 - 145 mmol/L    Potassium 3.4 (L) 3.5 - 5.1 mmol/L    Chloride 107 97 - 108 mmol/L    CO2 26 21 - 32 mmol/L    Anion gap 6 5 - 15 mmol/L    Glucose 80 65 - 100 mg/dL    BUN 11 6 - 20 MG/DL    Creatinine 1.07 0.70 - 1.30 MG/DL    BUN/Creatinine ratio 10 (L) 12 - 20      GFR est AA >60 >60 ml/min/1.73m2    GFR est non-AA >60 >60 ml/min/1.73m2    Calcium 7.9 (L) 8.5 - 10.1 MG/DL    Bilirubin, total 0.4 0.2 - 1.0 MG/DL    ALT (SGPT) 25 12 - 78 U/L    AST (SGOT) 15 15 - 37 U/L Alk. phosphatase 123 (H) 45 - 117 U/L    Protein, total 5.8 (L) 6.4 - 8.2 g/dL    Albumin 2.4 (L) 3.5 - 5.0 g/dL    Globulin 3.4 2.0 - 4.0 g/dL    A-G Ratio 0.7 (L) 1.1 - 2.2       Past Medical History:   Diagnosis Date    Other ill-defined conditions(799.89) 8-8-13    Hgb 15.1, K 4.1, creat 0.6, EKG: NSR, ? inf  MI    Other ill-defined conditions(799.89)     obesity    Other ill-defined conditions(799.89) 11-22-13    Hgb 14.9, K 3.8, creat 0.8, EKG: NSR     None     None   Lab reviewed  Imaging reviewed  Orders reviewed  Consultants note and other IDR notes reviewed. Signed:    Sherrell Coon MD  Date of Service:  8/12/2021

## 2021-08-12 NOTE — PROGRESS NOTES
Bedside shift change report given to Margaux Camacho RN (oncoming nurse) by Wilian Miller RN (offgoing nurse). Report included the following information SBAR, Kardex, Intake/Output, MAR and Recent Results.

## 2021-08-12 NOTE — PROGRESS NOTES
Transition of Care Plan   RUR- 11%   DISPOSITION: The disposition plan is home with family assistance; pending medical progression  o IV infusion: Referral sent to [employer]    F/U with PCP/Specialist     Transport: Family    Per rounds: The pt will require long term IV ABX; This cm sent a referral to [employer] to check benefits.     HX: Advanced Gastric Cancer treated by Kamari Chavez   CM: [address]. MSW,   [phone]

## 2021-08-13 VITALS
DIASTOLIC BLOOD PRESSURE: 99 MMHG | TEMPERATURE: 98.2 F | WEIGHT: 182.1 LBS | SYSTOLIC BLOOD PRESSURE: 150 MMHG | BODY MASS INDEX: 27.6 KG/M2 | HEART RATE: 85 BPM | OXYGEN SATURATION: 99 % | RESPIRATION RATE: 18 BRPM | HEIGHT: 68 IN

## 2021-08-13 LAB
ALBUMIN SERPL-MCNC: 2.5 G/DL (ref 3.5–5)
ALBUMIN/GLOB SERPL: 0.8 {RATIO} (ref 1.1–2.2)
ALP SERPL-CCNC: 126 U/L (ref 45–117)
ALT SERPL-CCNC: 25 U/L (ref 12–78)
ANION GAP SERPL CALC-SCNC: 6 MMOL/L (ref 5–15)
AST SERPL-CCNC: 18 U/L (ref 15–37)
BASOPHILS # BLD: 0.2 K/UL (ref 0–0.1)
BASOPHILS NFR BLD: 2 % (ref 0–1)
BILIRUB SERPL-MCNC: 0.4 MG/DL (ref 0.2–1)
BUN SERPL-MCNC: 10 MG/DL (ref 6–20)
BUN/CREAT SERPL: 11 (ref 12–20)
CALCIUM SERPL-MCNC: 7.5 MG/DL (ref 8.5–10.1)
CHLORIDE SERPL-SCNC: 109 MMOL/L (ref 97–108)
CO2 SERPL-SCNC: 23 MMOL/L (ref 21–32)
CREAT SERPL-MCNC: 0.91 MG/DL (ref 0.7–1.3)
DIFFERENTIAL METHOD BLD: ABNORMAL
EOSINOPHIL # BLD: 0.2 K/UL (ref 0–0.4)
EOSINOPHIL NFR BLD: 2 % (ref 0–7)
ERYTHROCYTE [DISTWIDTH] IN BLOOD BY AUTOMATED COUNT: 12.4 % (ref 11.5–14.5)
GLOBULIN SER CALC-MCNC: 3.2 G/DL (ref 2–4)
GLUCOSE SERPL-MCNC: 91 MG/DL (ref 65–100)
HCT VFR BLD AUTO: 30.5 % (ref 36.6–50.3)
HGB BLD-MCNC: 10 G/DL (ref 12.1–17)
IMM GRANULOCYTES # BLD AUTO: 0 K/UL
IMM GRANULOCYTES NFR BLD AUTO: 0 %
INR PPP: 1.1 (ref 0.9–1.1)
LACTATE SERPL-SCNC: 1 MMOL/L (ref 0.4–2)
LYMPHOCYTES # BLD: 1.8 K/UL (ref 0.8–3.5)
LYMPHOCYTES NFR BLD: 24 % (ref 12–49)
MAGNESIUM SERPL-MCNC: 1.6 MG/DL (ref 1.6–2.4)
MCH RBC QN AUTO: 30.5 PG (ref 26–34)
MCHC RBC AUTO-ENTMCNC: 32.8 G/DL (ref 30–36.5)
MCV RBC AUTO: 93 FL (ref 80–99)
MONOCYTES # BLD: 0.8 K/UL (ref 0–1)
MONOCYTES NFR BLD: 11 % (ref 5–13)
MYELOCYTES NFR BLD MANUAL: 2 %
NEUTS BAND NFR BLD MANUAL: 4 % (ref 0–6)
NEUTS SEG # BLD: 4.5 K/UL (ref 1.8–8)
NEUTS SEG NFR BLD: 55 % (ref 32–75)
NRBC # BLD: 0 K/UL (ref 0–0.01)
NRBC BLD-RTO: 0 PER 100 WBC
PLATELET # BLD AUTO: 240 K/UL (ref 150–400)
PMV BLD AUTO: 9.8 FL (ref 8.9–12.9)
POTASSIUM SERPL-SCNC: 3.3 MMOL/L (ref 3.5–5.1)
PROT SERPL-MCNC: 5.7 G/DL (ref 6.4–8.2)
PROTHROMBIN TIME: 11.7 SEC (ref 9–11.1)
RBC # BLD AUTO: 3.28 M/UL (ref 4.1–5.7)
RBC MORPH BLD: ABNORMAL
SODIUM SERPL-SCNC: 138 MMOL/L (ref 136–145)
WBC # BLD AUTO: 7.7 K/UL (ref 4.1–11.1)
WBC MORPH BLD: ABNORMAL

## 2021-08-13 PROCEDURE — 83605 ASSAY OF LACTIC ACID: CPT

## 2021-08-13 PROCEDURE — 74011250636 HC RX REV CODE- 250/636: Performed by: INTERNAL MEDICINE

## 2021-08-13 PROCEDURE — 74011250637 HC RX REV CODE- 250/637: Performed by: HOSPITALIST

## 2021-08-13 PROCEDURE — 74011250637 HC RX REV CODE- 250/637: Performed by: HEALTH CARE PROVIDER

## 2021-08-13 PROCEDURE — 85025 COMPLETE CBC W/AUTO DIFF WBC: CPT

## 2021-08-13 PROCEDURE — 83735 ASSAY OF MAGNESIUM: CPT

## 2021-08-13 PROCEDURE — 74011000258 HC RX REV CODE- 258: Performed by: HOSPITALIST

## 2021-08-13 PROCEDURE — 80053 COMPREHEN METABOLIC PANEL: CPT

## 2021-08-13 PROCEDURE — 74011000250 HC RX REV CODE- 250: Performed by: HOSPITALIST

## 2021-08-13 PROCEDURE — 85610 PROTHROMBIN TIME: CPT

## 2021-08-13 PROCEDURE — 36415 COLL VENOUS BLD VENIPUNCTURE: CPT

## 2021-08-13 PROCEDURE — 74011250636 HC RX REV CODE- 250/636: Performed by: HEALTH CARE PROVIDER

## 2021-08-13 PROCEDURE — 74011250636 HC RX REV CODE- 250/636: Performed by: HOSPITALIST

## 2021-08-13 RX ORDER — POTASSIUM CHLORIDE 1.5 G/1.77G
40 POWDER, FOR SOLUTION ORAL DAILY
Qty: 6 PACKET | Refills: 0 | Status: SHIPPED | OUTPATIENT
Start: 2021-08-13 | End: 2021-08-16

## 2021-08-13 RX ORDER — LEVOFLOXACIN 750 MG/1
750 TABLET ORAL DAILY
Qty: 8 TABLET | Refills: 0 | Status: SHIPPED | OUTPATIENT
Start: 2021-08-13 | End: 2021-08-21

## 2021-08-13 RX ADMIN — ONDANSETRON 8 MG: 2 INJECTION INTRAMUSCULAR; INTRAVENOUS at 08:47

## 2021-08-13 RX ADMIN — POTASSIUM BICARBONATE 40 MEQ: 782 TABLET, EFFERVESCENT ORAL at 08:22

## 2021-08-13 RX ADMIN — HYDROCODONE BITARTRATE AND ACETAMINOPHEN 10 MG: 7.5; 325 SOLUTION ORAL at 07:25

## 2021-08-13 RX ADMIN — CEFEPIME HYDROCHLORIDE 2 G: 2 INJECTION, POWDER, FOR SOLUTION INTRAVENOUS at 04:40

## 2021-08-13 RX ADMIN — HEPARIN SODIUM 5000 UNITS: 5000 INJECTION INTRAVENOUS; SUBCUTANEOUS at 01:46

## 2021-08-13 RX ADMIN — HYDROMORPHONE HYDROCHLORIDE 1 MG: 1 INJECTION, SOLUTION INTRAMUSCULAR; INTRAVENOUS; SUBCUTANEOUS at 03:29

## 2021-08-13 RX ADMIN — ONDANSETRON 8 MG: 2 INJECTION INTRAMUSCULAR; INTRAVENOUS at 03:29

## 2021-08-13 RX ADMIN — Medication 10 ML: at 05:11

## 2021-08-13 NOTE — DISCHARGE INSTRUCTIONS
Discharge Instructions       PATIENT ID: Odessa Reyes  MRN: [id number]   YOB: 1970    DATE OF ADMISSION: 8/7/2021  2:55 AM    DATE OF DISCHARGE: 8/13/2021    PRIMARY CARE PROVIDER: None     ATTENDING PHYSICIAN: Jonah Seo MD  DISCHARGING PROVIDER: Leroy García MD    To contact this individual call [phone] and ask the  to page. If unavailable ask to be transferred the Adult Hospitalist Department. DISCHARGE DIAGNOSES and CARE RECOMMENDATIONS:       Profound Acute kidney failure due to severe dehydration. You needed emergency dialysis. You kidney function has returned normal.  --Follow up with your doctors closely. If you develop recurrent nausesa vomiting talk to your doctor and go to the ED without delay. Sepsis. Pseudomonas bacteremia,culture result from [facility] . Your subsequent blood culture here was negative  -- Take the prescribed antibiotics as directed           Gastric adenocarcinoma-   --You need follow up with your oncologist,we recommend you make an appointment to discuss with them as soon as possible. Electronic prescription for the antibiotic and potassium sent to your [facility] pharmacy     DIET: Regular Diet      ACTIVITY: Activity as tolerated        PENDING TEST RESULTS:   At the time of discharge the following test results are still pending: none    FOLLOW UP APPOINTMENTS:   Follow-up Information    None            YOU WERE SEEN BY THE FOLLOWING CONSULTATIONS:   IP CONSULT TO ONCOLOGY  IP CONSULT TO NEPHROLOGY  IP CONSULT TO PALLIATIVE CARE - PROVIDER  IP CONSULT TO INFECTIOUS DISEASES    YOU HAD THE FOLLOWING PROCEDURES/SURGERIES  :   * No surgery found *              DISCHARGE MEDICATIONS:   See Medication Reconciliation Form    · It is important that you take the medication exactly as they are prescribed.    · Keep your medication in the bottles provided by the pharmacist and keep a list of the medication names, dosages, and times to be taken in your wallet. · Do not take other medications without consulting your doctor. NOTIFY YOUR PHYSICIAN FOR ANY OF THE FOLLOWING:   Fever over 101 degrees for 24 hours. Chest pain, shortness of breath, fever, chills, nausea, vomiting, diarrhea, change in mentation, falling, weakness, bleeding. Severe pain or pain not relieved by medications. Or, any other signs or symptoms that you may have questions about. Signed:    Kell Dee MD  8/13/2021  8:53 AM

## 2021-08-13 NOTE — DISCHARGE SUMMARY
Discharge Summary       PATIENT ID: Mehran Jones  MRN: [id number]   YOB: 1970    DATE OF ADMISSION: 8/7/2021  2:55 AM    DATE OF DISCHARGE: 08/13/21     PRIMARY CARE PROVIDER: None     ATTENDING PHYSICIAN: Vandana Burleson MD    DISCHARGING PROVIDER: Vandana Burleson MD    To contact this individual call [phone] and ask the  to page. If unavailable ask to be transferred the Adult Hospitalist Department. CONSULTATIONS: IP CONSULT TO ONCOLOGY  IP CONSULT TO NEPHROLOGY  IP CONSULT TO PALLIATIVE CARE - PROVIDER  IP CONSULT TO INFECTIOUS DISEASES    PROCEDURES/SURGERIES: * No surgery found *    ADMITTING [facility] COURSE:   HPI: 52M w/ hx of gastric adenocarcinoma diagnosed in January 2021, being seen at [facility] oncology under going chemotherapy, last dose of CHEMO FOLFIRI on 7/27/2021, also notable for abdominal lymphadenopathy, HLD, GERD, chronic pain on home opioid medication (oxycodone 10-15 q4h MS contin 15mg BID) presented to [address] [place] [address] for 4 days of nausea/vomiting, patient was noted to have Cr of 19.2, , K 6.6, patient received 6L of NS then started on D51/4 saline. Patient was also given Veltassa for hyperkalemia. On arrival to [facility] ICU patient is alert and awake, tachycardic in the 140's with norepinephrine at 16 mcg/min. Nephrology consulted to initiate CRRT. Pt c/o nausea/abdominal tenderness. Transferred out of ICU 8/9          DISCHARGE DIAGNOSES / PLAN:    Neutropenic sepsis. Sepsis resolved. Neutropenia resolved. Pseudomonas bacteremia from outside hospital  - [facility] from OS grew pseudomonas (see result in the media section)  -UA negative. CT chest ,abdomen and pelvis,no CT explanation for the patient's sepsis. -Blood cx from 8/7 here,NGSF. MRSA swab negative  -D/c vancomycin   -D consulted and recommended removal of the Port cath ,removed 8/11  -Based on the fax report and my conversation with [address] [place] [address] micro lab,they did not run sens for levaquin,but the pseudomonas was sensitive to ciprofloxacin. I dicussed with ID ,Dr Edson Rainey total of 14 days of abx from the day of iv cefepime to complete therapy with Po Levaquin 750 mg daily. Profound TOM ,predominantly prerenal 2/2 profound hypovolemia due to GI loss  - Resolved     Intractable n/v/d,improved related to gastric cancer  - controlled     Gastric adenocarcinoma- mets   - He was seen by oncologist here. Pt will need to f/u with his oncology,he states he does not want further chemotherapy. Hypokalemia   -received replacement and sent with  replacement   Hypomagnesemia  - replaced    Hypophosphatemia   - Replaced       I asked him and patient said he has enough nausea and pain medications at home and he does not want new prescription. PENDING TEST RESULTS:   At the time of discharge the following test results are still pending: none    FOLLOW UP APPOINTMENTS:    Follow-up Information     Follow up With Specialties Details Why Contact Info    None    None ([phone]) Patient stated that they have no PCP               DIET: Regular Diet    ACTIVITY: Activity as tolerated        DISCHARGE MEDICATIONS:  Current Discharge Medication List      START taking these medications    Details   potassium chloride (Klor-Con) 20 mEq pack Take 2 Packets by mouth daily for 3 days. Qty: 6 Packet, Refills: 0  Start date: 8/13/2021, End date: 8/16/2021      levoFLOXacin (Levaquin) 750 mg tablet Take 1 Tablet by mouth daily for 8 days. Qty: 8 Tablet, Refills: 0  Start date: 8/13/2021, End date: 8/21/2021               NOTIFY YOUR PHYSICIAN FOR ANY OF THE FOLLOWING:   Fever over 101 degrees for 24 hours. Chest pain, shortness of breath, fever, chills, nausea, vomiting, diarrhea, change in mentation, falling, weakness, bleeding. Severe pain or pain not relieved by medications. Or, any other signs or symptoms that you may have questions about.     DISPOSITION:  x  Home With:   OT  PT  [facility]  RN Long term SNF/Inpatient Rehab    Independent/assisted living    Hospice    Other:       PATIENT CONDITION AT DISCHARGE:     Functional status    Poor     Deconditioned    x Independent      Cognition    x Lucid     Forgetful     Dementia      Catheters/lines (plus indication)    Ross     PICC     PEG    x None      Code status   x  Full code     DNR      PHYSICAL EXAMINATION AT DISCHARGE:  General:          Alert, cooperative, no distress  HEENT:           Atraumatic, MMM            Neck:               Supple, symmetrical,  thyroid: non tender  Lungs:             Clear to auscultation bilaterally. No Wheezing or Rhonchi. No rales. Heart:              Regular  rhythm,  No  murmur   No edema  Abdomen:       Soft, mild epigastric tenderness without rebound or guarding. Not distended. Bowel sounds normal  Extremities:     No cyanosis. No clubbing,  +2 distal pulses  Skin:                Not pale. Not Jaundiced  No rashes   Psych:             Not anxious or agitated. Neurologic:      Alert, moves all extremities, oriented X 3. CHRONIC MEDICAL DIAGNOSES:  Problem List as of 8/13/2021 Date Reviewed: 8/13/2013        Codes Class Noted - Resolved    [facility]acute kidney injury) ([facility] ICD-10-CM: N17.9  ICD-9-CM: 584.9  8/7/2021 - Present        Tobacco abuse ICD-10-CM: Z72.0  ICD-9-CM: 305.1  8/13/2013 - Present    Overview Signed 8/13/2013  6:52 PM by Halina Sanders MD     Cessation encouraged [phone]             Primary localized osteoarthrosis, lower leg ICD-10-CM: M17.10  ICD-9-CM: 715.16  8/12/2013 - Present              Greater than 30 minutes were spent with the patient on counseling and coordination of care    Signed:    Dave Charles MD  8/13/2021  8:59 AM

## 2021-08-13 NOTE — PROGRESS NOTES
Hematology-Oncology Progress Note      Laurence Sandhu  1970  [id number]  8/13/2021      Subjective:   Feeling better, (though did have an episode of emesis this am),  No diarrhea, no significant pain    Allergies: Patient has no known allergies.   Current Facility-Administered Medications   Medication Dose Route Frequency Provider Last Rate Last Admin    loperamide (IMODIUM) capsule 2 mg  2 mg Oral Q4H PRN Hafsa Sharif MD        famotidine (PF) (PEPCID) 20 mg in 0.9% sodium chloride 10 mL injection  20 mg IntraVENous Q12H Abraham Aranda MD   20 mg at 08/12/21 2025    HYDROcodone-acetaminophen (HYCET) 0.5-21.7 mg/mL oral solution 10 mg  10 mg Oral Q6H PRN Abraham Aranda MD   10 mg at 08/13/21 0725    ondansetron (ZOFRAN ODT) tablet 8 mg  8 mg Oral Q8H PRN [address]., MD        Or    ondansetron [facility]) injection 8 mg  8 mg IntraVENous Q6H PRN Kathy Gonzalez MD   8 mg at 08/13/21 0847    HYDROmorphone (DILAUDID) injection 1 mg  1 mg IntraVENous Q6H PRN Abraham Aranda MD   1 mg at 08/13/21 0329    cefepime (MAXIPIME) 2 g in 0.9% sodium chloride (MBP/ADV) 100 mL MBP  2 g IntraVENous Q8H Abraham Aranda  mL/hr at 08/13/21 0440 2 g at 08/13/21 0440    potassium bicarb-citric acid (EFFER-K) tablet 40 mEq  40 mEq Oral BID Espiridion Alejandro, DO   40 mEq at 08/13/21 8595    heparin (porcine) injection 5,000 Units  5,000 Units SubCUTAneous Q8H Espiridion Alejandro, DO   5,000 Units at 08/13/21 0146    sodium chloride (NS) flush 5-40 mL  5-40 mL IntraVENous Q8H Lay Hoff MD   10 mL at 08/13/21 0511    sodium chloride (NS) flush 5-40 mL  5-40 mL IntraVENous PRN Lay Hoff MD   10 mL at 08/12/21 0920    acetaminophen (TYLENOL) tablet 650 mg  650 mg Oral Q6H PRN Lay Hoff MD   650 mg at 08/12/21 2030    Or    acetaminophen (TYLENOL) suppository 650 mg  650 mg Rectal Q6H PRN Lay Hoff MD   650 mg at 08/07/21 0446    polyethylene glycol (MIRALAX) packet 17 g  17 g Oral DAILY PRN Sun, Rene Thompson MD        lactated Ringers infusion  50 mL/hr IntraVENous CONTINUOUS Hansa Argueta MD 50 mL/hr at 08/10/21 1126 50 mL/hr at 08/10/21 1126     Objective:     Patient Vitals for the past 24 hrs:   BP Temp Pulse Resp SpO2   08/13/21 0728 (!) 150/99 98.2 °F (36.8 °C) 85 18 99 %   08/13/21 0317 (!) 150/87 98.3 °F (36.8 °C) 80 18 100 %   08/12/21 2107 (!) 142/87 99.2 °F (37.3 °C) 91 18 98 %   08/12/21 1359 (!) 145/87 98.5 °F (36.9 °C) 82 18 94 %       Gen: NAD  HEENT: PERRL, Sclerae anicteric  Lungs clear  Cor rrr  Abd. Soft non tend  Ext no cce    Available labs reviewed:  Labs:    Recent Results (from the past 24 hour(s))   CBC WITH AUTOMATED DIFF    Collection Time: 08/13/21  4:37 AM   Result Value Ref Range    WBC 7.7 4.1 - 11.1 K/uL    RBC 3.28 (L) 4.10 - 5.70 M/uL    HGB 10.0 (L) 12.1 - 17.0 g/dL    HCT 30.5 (L) 36.6 - 50.3 %    MCV 93.0 80.0 - 99.0 FL    MCH 30.5 26.0 - 34.0 PG    MCHC 32.8 30.0 - 36.5 g/dL    RDW 12.4 11.5 - 14.5 %    PLATELET 338 854 - 294 K/uL    MPV 9.8 8.9 - 12.9 FL    NRBC 0.0 0  WBC    ABSOLUTE NRBC 0.00 0.00 - 0.01 K/uL    NEUTROPHILS 55 32 - 75 %    BAND NEUTROPHILS 4 0 - 6 %    LYMPHOCYTES 24 12 - 49 %    MONOCYTES 11 5 - 13 %    EOSINOPHILS 2 0 - 7 %    BASOPHILS 2 (H) 0 - 1 %    MYELOCYTES 2 (H) 0 %    IMMATURE GRANULOCYTES 0 %    ABS. NEUTROPHILS 4.5 1.8 - 8.0 K/UL    ABS. LYMPHOCYTES 1.8 0.8 - 3.5 K/UL    ABS. MONOCYTES 0.8 0.0 - 1.0 K/UL    ABS. EOSINOPHILS 0.2 0.0 - 0.4 K/UL    ABS. BASOPHILS 0.2 (H) 0.0 - 0.1 K/UL    ABS. IMM.  GRANS. 0.0 K/UL    DF MANUAL      RBC COMMENTS NORMOCYTIC, NORMOCHROMIC      WBC COMMENTS REACTIVE LYMPHS     LACTIC ACID    Collection Time: 08/13/21  4:37 AM   Result Value Ref Range    Lactic acid 1.0 0.4 - 2.0 MMOL/L   PROTHROMBIN TIME + INR    Collection Time: 08/13/21  4:37 AM   Result Value Ref Range    INR 1.1 0.9 - 1.1      Prothrombin time 11.7 (H) 9.0 - 11.1 sec   MAGNESIUM    Collection Time: 08/13/21  4:37 AM   Result Value Ref Range    Magnesium 1.6 1.6 - 2.4 mg/dL   METABOLIC PANEL, COMPREHENSIVE    Collection Time: 08/13/21  4:37 AM   Result Value Ref Range    Sodium 138 136 - 145 mmol/L    Potassium 3.3 (L) 3.5 - 5.1 mmol/L    Chloride 109 (H) 97 - 108 mmol/L    CO2 23 21 - 32 mmol/L    Anion gap 6 5 - 15 mmol/L    Glucose 91 65 - 100 mg/dL    BUN 10 6 - 20 MG/DL    Creatinine 0.91 0.70 - 1.30 MG/DL    BUN/Creatinine ratio 11 (L) 12 - 20      GFR est AA >60 >60 ml/min/1.73m2    GFR est non-AA >60 >60 ml/min/1.73m2    Calcium 7.5 (L) 8.5 - 10.1 MG/DL    Bilirubin, total 0.4 0.2 - 1.0 MG/DL    ALT (SGPT) 25 12 - 78 U/L    AST (SGOT) 18 15 - 37 U/L    Alk. phosphatase 126 (H) 45 - 117 U/L    Protein, total 5.7 (L) 6.4 - 8.2 g/dL    Albumin 2.5 (L) 3.5 - 5.0 g/dL    Globulin 3.2 2.0 - 4.0 g/dL    A-G Ratio 0.8 (L) 1.1 - 2.2           Assessment and Plan     47 y/o man with locally advanced gastric CA, seen by the [facility] in [place], now admitted with neutropenic fever after treatment with FLOT (docetaxel held), ARF. 1. N. Fever: ANC has improved. GCSF held. Noted to have pseudomonas bacteremia (cultures from outside hospital), seen by ID , port removed. Plan to send home on levaquin noted    2. Gastric CA: Defer overarching plan to outpatient oncologist in [address] [place] [address]. .   will need to see him/her in clinic prior to further cytotoxic chemotherapy. 3. ARF: resolved with normal creatinine    4. Diarrhea resolved  5. Emesis. .. intermittent.  Responds to zofran no sign of obstruction    Qiana Nichols MD

## 2021-08-13 NOTE — PROGRESS NOTES
Volunteer assist request was put in. Patient refused to wait for assistance and walked independently down to his ride.